# Patient Record
Sex: MALE | Race: WHITE | NOT HISPANIC OR LATINO | Employment: OTHER | URBAN - METROPOLITAN AREA
[De-identification: names, ages, dates, MRNs, and addresses within clinical notes are randomized per-mention and may not be internally consistent; named-entity substitution may affect disease eponyms.]

---

## 2023-08-28 LAB
ALANINE AMINOTRANSFERASE (SGPT) (U/L) IN SER/PLAS: 18 U/L (ref 10–52)
ALBUMIN (G/DL) IN SER/PLAS: 4 G/DL (ref 3.4–5)
ALKALINE PHOSPHATASE (U/L) IN SER/PLAS: 59 U/L (ref 33–136)
ANION GAP IN SER/PLAS: 13 MMOL/L (ref 10–20)
APPEARANCE, URINE: CLEAR
ASPARTATE AMINOTRANSFERASE (SGOT) (U/L) IN SER/PLAS: 17 U/L (ref 9–39)
BILIRUBIN TOTAL (MG/DL) IN SER/PLAS: 0.8 MG/DL (ref 0–1.2)
BILIRUBIN, URINE: NEGATIVE
BLOOD, URINE: NEGATIVE
CALCIDIOL (25 OH VITAMIN D3) (NG/ML) IN SER/PLAS: 34 NG/ML
CALCIUM (MG/DL) IN SER/PLAS: 8.9 MG/DL (ref 8.6–10.3)
CARBON DIOXIDE, TOTAL (MMOL/L) IN SER/PLAS: 24 MMOL/L (ref 21–32)
CHLORIDE (MMOL/L) IN SER/PLAS: 104 MMOL/L (ref 98–107)
CHOLESTEROL (MG/DL) IN SER/PLAS: 117 MG/DL (ref 0–199)
CHOLESTEROL IN HDL (MG/DL) IN SER/PLAS: 39.9 MG/DL
CHOLESTEROL/HDL RATIO: 2.9
COLOR, URINE: YELLOW
CREATININE (MG/DL) IN SER/PLAS: 1.1 MG/DL (ref 0.5–1.3)
ERYTHROCYTE DISTRIBUTION WIDTH (RATIO) BY AUTOMATED COUNT: 14.5 % (ref 11.5–14.5)
ERYTHROCYTE MEAN CORPUSCULAR HEMOGLOBIN CONCENTRATION (G/DL) BY AUTOMATED: 32.8 G/DL (ref 32–36)
ERYTHROCYTE MEAN CORPUSCULAR VOLUME (FL) BY AUTOMATED COUNT: 98 FL (ref 80–100)
ERYTHROCYTES (10*6/UL) IN BLOOD BY AUTOMATED COUNT: 4.24 X10E12/L (ref 4.5–5.9)
GFR MALE: 69 ML/MIN/1.73M2
GLUCOSE (MG/DL) IN SER/PLAS: 91 MG/DL (ref 74–99)
GLUCOSE, URINE: NEGATIVE MG/DL
HEMATOCRIT (%) IN BLOOD BY AUTOMATED COUNT: 41.5 % (ref 41–52)
HEMOGLOBIN (G/DL) IN BLOOD: 13.6 G/DL (ref 13.5–17.5)
KETONES, URINE: NEGATIVE MG/DL
LDL: 31 MG/DL (ref 0–99)
LEUKOCYTE ESTERASE, URINE: NEGATIVE
LEUKOCYTES (10*3/UL) IN BLOOD BY AUTOMATED COUNT: 8.7 X10E9/L (ref 4.4–11.3)
MUCUS, URINE: NORMAL /LPF
NITRITE, URINE: NEGATIVE
NON HDL CHOLESTEROL: 77 MG/DL
PH, URINE: 5 (ref 5–8)
PLATELETS (10*3/UL) IN BLOOD AUTOMATED COUNT: 230 X10E9/L (ref 150–450)
POTASSIUM (MMOL/L) IN SER/PLAS: 4.3 MMOL/L (ref 3.5–5.3)
PROSTATE SPECIFIC AG (NG/ML) IN SER/PLAS: 1.19 NG/ML (ref 0–4)
PROTEIN TOTAL: 7.1 G/DL (ref 6.4–8.2)
PROTEIN, URINE: ABNORMAL MG/DL
RBC, URINE: NORMAL /HPF (ref 0–5)
SODIUM (MMOL/L) IN SER/PLAS: 137 MMOL/L (ref 136–145)
SPECIFIC GRAVITY, URINE: 1.02 (ref 1–1.03)
THYROTROPIN (MIU/L) IN SER/PLAS BY DETECTION LIMIT <= 0.05 MIU/L: 1.97 MIU/L (ref 0.44–3.98)
TRIGLYCERIDE (MG/DL) IN SER/PLAS: 231 MG/DL (ref 0–149)
UREA NITROGEN (MG/DL) IN SER/PLAS: 17 MG/DL (ref 6–23)
UROBILINOGEN, URINE: <2 MG/DL (ref 0–1.9)
VLDL: 46 MG/DL (ref 0–40)
WBC, URINE: 1 /HPF (ref 0–5)

## 2023-12-12 PROBLEM — E78.2 MIXED HYPERLIPIDEMIA: Status: ACTIVE | Noted: 2023-12-12

## 2023-12-12 PROBLEM — G47.30 SLEEP APNEA: Status: ACTIVE | Noted: 2023-12-12

## 2023-12-12 PROBLEM — I25.10 ARTERIOSCLEROTIC CARDIOVASCULAR DISEASE: Status: ACTIVE | Noted: 2023-12-12

## 2023-12-12 PROBLEM — I71.21 ANEURYSM OF ASCENDING AORTA WITHOUT RUPTURE (CMS-HCC): Status: ACTIVE | Noted: 2023-12-12

## 2023-12-12 PROBLEM — J44.1 COPD EXACERBATION (MULTI): Status: ACTIVE | Noted: 2023-12-12

## 2023-12-12 PROBLEM — H61.22 IMPACTED CERUMEN OF LEFT EAR: Status: ACTIVE | Noted: 2023-12-12

## 2023-12-12 PROBLEM — J44.9 COPD (CHRONIC OBSTRUCTIVE PULMONARY DISEASE) (MULTI): Status: ACTIVE | Noted: 2023-12-12

## 2023-12-12 PROBLEM — J40 BRONCHITIS: Status: ACTIVE | Noted: 2023-12-12

## 2023-12-12 PROBLEM — I25.708 ATHEROSCLEROSIS OF CABG W OTH ANGINA PECTORIS (CMS-HCC): Status: ACTIVE | Noted: 2023-12-12

## 2023-12-12 PROBLEM — I50.9 CHF (CONGESTIVE HEART FAILURE) (MULTI): Status: ACTIVE | Noted: 2023-12-12

## 2023-12-12 PROBLEM — D22.9 ATYPICAL MOLE: Status: ACTIVE | Noted: 2023-12-12

## 2023-12-12 PROBLEM — I10 ESSENTIAL HYPERTENSION: Status: ACTIVE | Noted: 2023-12-12

## 2023-12-12 PROBLEM — E66.9 CLASS 1 OBESITY WITH BODY MASS INDEX (BMI) OF 32.0 TO 32.9 IN ADULT: Status: ACTIVE | Noted: 2023-12-12

## 2023-12-12 PROBLEM — H81.03 MENIERE'S DISEASE OF BOTH EARS: Status: ACTIVE | Noted: 2023-12-12

## 2023-12-12 PROBLEM — Z98.62 STATUS POST ANGIOPLASTY: Status: ACTIVE | Noted: 2023-12-12

## 2023-12-12 PROBLEM — E66.811 CLASS 1 OBESITY WITH BODY MASS INDEX (BMI) OF 32.0 TO 32.9 IN ADULT: Status: ACTIVE | Noted: 2023-12-12

## 2023-12-12 RX ORDER — TAMSULOSIN HYDROCHLORIDE 0.4 MG/1
0.4 CAPSULE ORAL DAILY
COMMUNITY

## 2023-12-12 RX ORDER — TRIAMCINOLONE ACETONIDE 1 MG/G
1 CREAM TOPICAL 3 TIMES DAILY
COMMUNITY
Start: 2023-01-13

## 2023-12-12 RX ORDER — AMLODIPINE BESYLATE 5 MG/1
1 TABLET ORAL DAILY
COMMUNITY
Start: 2021-11-01 | End: 2024-01-03 | Stop reason: SDUPTHER

## 2023-12-12 RX ORDER — EZETIMIBE 10 MG/1
1 TABLET ORAL DAILY
COMMUNITY
Start: 2021-11-01 | End: 2024-01-03 | Stop reason: SDUPTHER

## 2023-12-12 RX ORDER — VALSARTAN 80 MG/1
1 TABLET ORAL DAILY
COMMUNITY
Start: 2021-11-01 | End: 2024-01-03 | Stop reason: SDUPTHER

## 2023-12-12 RX ORDER — NITROGLYCERIN 0.4 MG/1
0.4 TABLET SUBLINGUAL EVERY 5 MIN PRN
COMMUNITY
Start: 2021-11-01

## 2023-12-12 RX ORDER — IPRATROPIUM BROMIDE AND ALBUTEROL SULFATE 2.5; .5 MG/3ML; MG/3ML
3 SOLUTION RESPIRATORY (INHALATION) EVERY 4 HOURS PRN
COMMUNITY
End: 2024-02-08 | Stop reason: SDUPTHER

## 2023-12-12 RX ORDER — ROSUVASTATIN CALCIUM 40 MG/1
1 TABLET, COATED ORAL DAILY
COMMUNITY
Start: 2021-11-01 | End: 2024-01-03 | Stop reason: SDUPTHER

## 2023-12-12 RX ORDER — METOPROLOL SUCCINATE 25 MG/1
1 TABLET, EXTENDED RELEASE ORAL DAILY
COMMUNITY
Start: 2021-11-01 | End: 2024-01-03 | Stop reason: SDUPTHER

## 2023-12-12 RX ORDER — FUROSEMIDE 40 MG/1
40 TABLET ORAL
COMMUNITY
Start: 2021-11-01 | End: 2024-01-03 | Stop reason: SDUPTHER

## 2023-12-12 RX ORDER — CLOPIDOGREL BISULFATE 75 MG/1
1 TABLET ORAL DAILY
COMMUNITY
Start: 2019-08-31 | End: 2024-01-03 | Stop reason: SDUPTHER

## 2024-01-03 ENCOUNTER — OFFICE VISIT (OUTPATIENT)
Dept: PRIMARY CARE | Facility: CLINIC | Age: 79
End: 2024-01-03
Payer: COMMERCIAL

## 2024-01-03 VITALS
HEART RATE: 60 BPM | TEMPERATURE: 97.8 F | BODY MASS INDEX: 34.54 KG/M2 | SYSTOLIC BLOOD PRESSURE: 120 MMHG | DIASTOLIC BLOOD PRESSURE: 78 MMHG | HEIGHT: 72 IN | WEIGHT: 255 LBS

## 2024-01-03 DIAGNOSIS — J40 BRONCHITIS: Primary | ICD-10-CM

## 2024-01-03 DIAGNOSIS — I50.32 CHRONIC DIASTOLIC CONGESTIVE HEART FAILURE (MULTI): ICD-10-CM

## 2024-01-03 DIAGNOSIS — I10 ESSENTIAL HYPERTENSION: ICD-10-CM

## 2024-01-03 DIAGNOSIS — E78.2 MIXED HYPERLIPIDEMIA: ICD-10-CM

## 2024-01-03 PROCEDURE — 99214 OFFICE O/P EST MOD 30 MIN: CPT | Performed by: INTERNAL MEDICINE

## 2024-01-03 PROCEDURE — 96372 THER/PROPH/DIAG INJ SC/IM: CPT | Performed by: INTERNAL MEDICINE

## 2024-01-03 PROCEDURE — 1036F TOBACCO NON-USER: CPT | Performed by: INTERNAL MEDICINE

## 2024-01-03 PROCEDURE — 3078F DIAST BP <80 MM HG: CPT | Performed by: INTERNAL MEDICINE

## 2024-01-03 PROCEDURE — 1159F MED LIST DOCD IN RCRD: CPT | Performed by: INTERNAL MEDICINE

## 2024-01-03 PROCEDURE — 3074F SYST BP LT 130 MM HG: CPT | Performed by: INTERNAL MEDICINE

## 2024-01-03 RX ORDER — VALSARTAN 80 MG/1
80 TABLET ORAL DAILY
Qty: 90 TABLET | Refills: 1 | Status: SHIPPED | OUTPATIENT
Start: 2024-01-03

## 2024-01-03 RX ORDER — ASPIRIN 81 MG/1
81 TABLET ORAL DAILY
COMMUNITY
End: 2024-03-26 | Stop reason: ALTCHOICE

## 2024-01-03 RX ORDER — AMLODIPINE BESYLATE 5 MG/1
5 TABLET ORAL DAILY
Qty: 90 TABLET | Refills: 1 | Status: SHIPPED | OUTPATIENT
Start: 2024-01-03

## 2024-01-03 RX ORDER — EZETIMIBE 10 MG/1
10 TABLET ORAL DAILY
Qty: 90 TABLET | Refills: 1 | Status: SHIPPED | OUTPATIENT
Start: 2024-01-03

## 2024-01-03 RX ORDER — METHYLPREDNISOLONE ACETATE 80 MG/ML
80 INJECTION, SUSPENSION INTRA-ARTICULAR; INTRALESIONAL; INTRAMUSCULAR; SOFT TISSUE ONCE
Status: COMPLETED | OUTPATIENT
Start: 2024-01-03 | End: 2024-01-03

## 2024-01-03 RX ORDER — FUROSEMIDE 40 MG/1
40 TABLET ORAL 3 TIMES WEEKLY
Qty: 36 TABLET | Refills: 1 | Status: SHIPPED | OUTPATIENT
Start: 2024-01-03

## 2024-01-03 RX ORDER — AZITHROMYCIN 500 MG/1
500 TABLET, FILM COATED ORAL DAILY
Qty: 5 TABLET | Refills: 0 | Status: SHIPPED | OUTPATIENT
Start: 2024-01-03 | End: 2024-01-08

## 2024-01-03 RX ORDER — ROSUVASTATIN CALCIUM 40 MG/1
40 TABLET, COATED ORAL DAILY
Qty: 90 TABLET | Refills: 1 | Status: SHIPPED | OUTPATIENT
Start: 2024-01-03

## 2024-01-03 RX ORDER — METOPROLOL SUCCINATE 25 MG/1
25 TABLET, EXTENDED RELEASE ORAL DAILY
Qty: 90 TABLET | Refills: 1 | Status: SHIPPED | OUTPATIENT
Start: 2024-01-03

## 2024-01-03 RX ORDER — PREDNISONE 20 MG/1
20 TABLET ORAL 2 TIMES DAILY
Qty: 10 TABLET | Refills: 0 | Status: SHIPPED | OUTPATIENT
Start: 2024-01-03 | End: 2024-01-08

## 2024-01-03 RX ORDER — CLOPIDOGREL BISULFATE 75 MG/1
75 TABLET ORAL DAILY
Qty: 90 TABLET | Refills: 1 | Status: SHIPPED | OUTPATIENT
Start: 2024-01-03

## 2024-01-03 RX ADMIN — METHYLPREDNISOLONE ACETATE 80 MG: 80 INJECTION, SUSPENSION INTRA-ARTICULAR; INTRALESIONAL; INTRAMUSCULAR; SOFT TISSUE at 10:12

## 2024-01-03 ASSESSMENT — ENCOUNTER SYMPTOMS
DEPRESSION: 0
COUGH: 1
SHORTNESS OF BREATH: 1
SORE THROAT: 0
DYSURIA: 0
LIGHT-HEADEDNESS: 0
ABDOMINAL PAIN: 0
MUSCULOSKELETAL PAIN: 1
MYALGIAS: 0
ACTIVITY CHANGE: 0

## 2024-01-03 ASSESSMENT — PATIENT HEALTH QUESTIONNAIRE - PHQ9
2. FEELING DOWN, DEPRESSED OR HOPELESS: NOT AT ALL
1. LITTLE INTEREST OR PLEASURE IN DOING THINGS: NOT AT ALL
SUM OF ALL RESPONSES TO PHQ9 QUESTIONS 1 AND 2: 0

## 2024-01-03 NOTE — PROGRESS NOTES
CHIEF COMPLAINT:    Lui Yu is a 78 y.o. male who presents for Follow-up (6 months), Med Refill, Sinusitis (FOR THE PAST WEEK ), Shortness of Breath, Cough, and Muscle Pain.    HISTORY OF PRESENT ILLNESS:  Lui Yu  is a pleasant 78-year-old gentleman comes here for medications refill.  Otherwise he also has cough, congestion thick nasal discharge.  He has been using facial tissue a lot.  He did not sleep well last night.  He denies any fever or chills.  In fact he did not even mention me about this respiratory issues.  He thought he would be talking about.  However he sounded very nasally.  He has nasal discharge and also occasional cough and wheezing.  Patient got this from the family and friends probably will back together during the Terri time.    Med Refill  Associated symptoms include coughing. Pertinent negatives include no abdominal pain, chest pain, congestion, myalgias, rash or sore throat.   Sinusitis  Associated symptoms include coughing and shortness of breath. Pertinent negatives include no congestion or sore throat.   Shortness of Breath  Pertinent negatives include no abdominal pain, chest pain, rash or sore throat.   Cough  Associated symptoms include shortness of breath. Pertinent negatives include no chest pain, myalgias, rash or sore throat.   Muscle Pain  Associated symptoms include shortness of breath. Pertinent negatives include no abdominal pain, chest pain, dysuria or rash.       Review of Systems   Constitutional:  Negative for activity change.   HENT:  Negative for congestion and sore throat.    Respiratory:  Positive for cough and shortness of breath.    Cardiovascular:  Negative for chest pain.   Gastrointestinal:  Negative for abdominal pain.   Endocrine: Negative for polyuria.   Genitourinary:  Negative for dysuria.   Musculoskeletal:  Negative for myalgias.   Skin:  Negative for rash.   Neurological:  Negative for light-headedness.   Psychiatric/Behavioral:  Negative  for behavioral problems.      Visit Vitals  /78 (BP Location: Right arm, Patient Position: Sitting, BP Cuff Size: Large adult)   Pulse 60   Temp 36.6 °C (97.8 °F) (Temporal)   Ht 1.829 m (6')   Wt 116 kg (255 lb)   BMI 34.58 kg/m²   Smoking Status Former   BSA 2.43 m²         Wt Readings from Last 10 Encounters:   01/03/24 116 kg (255 lb)   07/28/23 113 kg (249 lb)   06/23/23 114 kg (251 lb 4 oz)   06/01/23 113 kg (248 lb 12.8 oz)   02/15/23 114 kg (252 lb)   01/13/23 116 kg (255 lb)   08/16/22 114 kg (252 lb)   07/18/22 112 kg (247 lb 4 oz)   04/18/22 113 kg (250 lb)   02/02/22 112 kg (248 lb)       Physical Exam  Vitals and nursing note reviewed.   Constitutional:       Appearance: Normal appearance.   HENT:      Head: Normocephalic.      Right Ear: Tympanic membrane normal.      Left Ear: Tympanic membrane normal.      Nose: Nose normal.      Mouth/Throat:      Mouth: Mucous membranes are moist.   Cardiovascular:      Rate and Rhythm: Normal rate and regular rhythm.      Pulses: Normal pulses.      Heart sounds: No murmur heard.  Pulmonary:      Effort: No respiratory distress.      Breath sounds: Normal breath sounds.   Abdominal:      Palpations: Abdomen is soft.   Musculoskeletal:      Cervical back: Neck supple.      Right lower leg: No edema.      Left lower leg: No edema.   Skin:     General: Skin is warm.      Findings: No rash.   Neurological:      General: No focal deficit present.      Mental Status: He is alert and oriented to person, place, and time.   Psychiatric:         Mood and Affect: Mood normal.        RECENT LABS:  Lab Results   Component Value Date    WBC 8.7 08/28/2023    HGB 13.6 08/28/2023    HCT 41.5 08/28/2023     08/28/2023    CHOL 117 08/28/2023    TRIG 231 (H) 08/28/2023    HDL 39.9 (A) 08/28/2023    ALT 18 08/28/2023    AST 17 08/28/2023     08/28/2023    K 4.3 08/28/2023     08/28/2023    CREATININE 1.10 08/28/2023    BUN 17 08/28/2023    CO2 24 08/28/2023     TSH 1.97 08/28/2023    PSA 1.19 08/28/2023    INR 1.0 08/29/2019     IMAGING:  Reviewed:   MEDICATIONS:   Current Outpatient Medications   Medication Instructions    amLODIPine (NORVASC) 5 mg, oral, Daily    aspirin 81 mg, oral, Daily, as directed    azithromycin (ZITHROMAX) 500 mg, oral, Daily    clopidogrel (PLAVIX) 75 mg, oral, Daily    ezetimibe (ZETIA) 10 mg, oral, Daily    furosemide (LASIX) 40 mg, oral, 3 times weekly, TAKE 1 TABLET BY MOUTH ON MONDAY, WEDNESDAY, AND FRIDAY    ipratropium-albuteroL (Duo-Neb) 0.5-2.5 mg/3 mL nebulizer solution 3 mL, nebulization, Every 4 hours PRN    metoprolol succinate XL (TOPROL-XL) 25 mg, oral, Daily    nitroglycerin (NITROSTAT) 0.4 mg, sublingual, Every 5 min PRN, PLACE 1 TABLET UNDER THE TONGUE EVERY 5 MINUTES FOR UP TO 3 DOSES AS NEEDED FOR CHEST PAIN.CALL 911 IF PAIN PERSISTS.<BR>    predniSONE (DELTASONE) 20 mg, oral, 2 times daily    rosuvastatin (CRESTOR) 40 mg, oral, Daily    tamsulosin (FLOMAX) 0.4 mg, oral, Daily    triamcinolone (Kenalog) 0.1 % cream 1 Application, Topical, 3 times daily    valsartan (DIOVAN) 80 mg, oral, Daily      TODAY'S VISIT  DX:   1. Bronchitis  methylPREDNISolone acetate (DEPO-Medrol) injection 80 mg    azithromycin (Zithromax) 500 mg tablet    predniSONE (Deltasone) 20 mg tablet      2. Essential hypertension  amLODIPine (Norvasc) 5 mg tablet    clopidogrel (Plavix) 75 mg tablet    metoprolol succinate XL (Toprol-XL) 25 mg 24 hr tablet    furosemide (Lasix) 40 mg tablet    valsartan (Diovan) 80 mg tablet      3. Mixed hyperlipidemia  ezetimibe (Zetia) 10 mg tablet    rosuvastatin (Crestor) 40 mg tablet      4. Chronic diastolic congestive heart failure (CMS/HCC)           MEDICAL DECISION MAKING:  - Recent lab work and relevant imaging studies have been reviewed.    - The current active medical co morbidities have been considered.   - Relevant correspondence/notes from specialty consultants were reviewed and discussed with patient.     - Patient was given treatment as per above plan.   - Patient will continue with current medical therapy and plan.   - Medication have been sent for refill.    - Next Follow up in 6 months..

## 2024-02-08 ENCOUNTER — OFFICE VISIT (OUTPATIENT)
Dept: PRIMARY CARE | Facility: CLINIC | Age: 79
End: 2024-02-08
Payer: COMMERCIAL

## 2024-02-08 VITALS
WEIGHT: 255 LBS | TEMPERATURE: 98.4 F | SYSTOLIC BLOOD PRESSURE: 118 MMHG | HEIGHT: 70 IN | BODY MASS INDEX: 36.51 KG/M2 | HEART RATE: 60 BPM | DIASTOLIC BLOOD PRESSURE: 80 MMHG

## 2024-02-08 DIAGNOSIS — I10 ESSENTIAL HYPERTENSION: ICD-10-CM

## 2024-02-08 DIAGNOSIS — J44.1 COPD EXACERBATION (MULTI): ICD-10-CM

## 2024-02-08 DIAGNOSIS — J44.0 CHRONIC OBSTRUCTIVE PULMONARY DISEASE WITH ACUTE LOWER RESPIRATORY INFECTION (MULTI): ICD-10-CM

## 2024-02-08 DIAGNOSIS — J20.9 ACUTE BRONCHITIS, UNSPECIFIED ORGANISM: ICD-10-CM

## 2024-02-08 DIAGNOSIS — J40 BRONCHITIS: Primary | ICD-10-CM

## 2024-02-08 DIAGNOSIS — I50.32 CHRONIC DIASTOLIC CONGESTIVE HEART FAILURE (MULTI): ICD-10-CM

## 2024-02-08 DIAGNOSIS — H10.33 ACUTE CONJUNCTIVITIS OF BOTH EYES, UNSPECIFIED ACUTE CONJUNCTIVITIS TYPE: ICD-10-CM

## 2024-02-08 PROCEDURE — 1036F TOBACCO NON-USER: CPT | Performed by: FAMILY MEDICINE

## 2024-02-08 PROCEDURE — 99214 OFFICE O/P EST MOD 30 MIN: CPT | Performed by: FAMILY MEDICINE

## 2024-02-08 PROCEDURE — 1159F MED LIST DOCD IN RCRD: CPT | Performed by: FAMILY MEDICINE

## 2024-02-08 PROCEDURE — 3074F SYST BP LT 130 MM HG: CPT | Performed by: FAMILY MEDICINE

## 2024-02-08 PROCEDURE — 3079F DIAST BP 80-89 MM HG: CPT | Performed by: FAMILY MEDICINE

## 2024-02-08 RX ORDER — GUAIFENESIN 600 MG/1
1200 TABLET, EXTENDED RELEASE ORAL 2 TIMES DAILY
Qty: 120 TABLET | Refills: 11 | Status: SHIPPED | OUTPATIENT
Start: 2024-02-08 | End: 2025-02-07

## 2024-02-08 RX ORDER — PREDNISONE 20 MG/1
40 TABLET ORAL DAILY
Qty: 10 TABLET | Refills: 0 | Status: SHIPPED | OUTPATIENT
Start: 2024-02-08 | End: 2024-02-13

## 2024-02-08 RX ORDER — IPRATROPIUM BROMIDE AND ALBUTEROL SULFATE 2.5; .5 MG/3ML; MG/3ML
3 SOLUTION RESPIRATORY (INHALATION) EVERY 4 HOURS PRN
Qty: 180 ML | Refills: 1 | Status: SHIPPED | OUTPATIENT
Start: 2024-02-08

## 2024-02-08 RX ORDER — DOXYCYCLINE 100 MG/1
100 CAPSULE ORAL 2 TIMES DAILY
Qty: 20 CAPSULE | Refills: 0 | Status: SHIPPED | OUTPATIENT
Start: 2024-02-08 | End: 2024-02-18

## 2024-02-08 RX ORDER — TOBRAMYCIN 3 MG/ML
2 SOLUTION/ DROPS OPHTHALMIC EVERY 4 HOURS
Qty: 25 ML | Refills: 0 | Status: SHIPPED | OUTPATIENT
Start: 2024-02-08 | End: 2024-02-19 | Stop reason: SDUPTHER

## 2024-02-08 ASSESSMENT — ENCOUNTER SYMPTOMS: DEPRESSION: 0

## 2024-02-08 ASSESSMENT — PATIENT HEALTH QUESTIONNAIRE - PHQ9
SUM OF ALL RESPONSES TO PHQ9 QUESTIONS 1 AND 2: 0
1. LITTLE INTEREST OR PLEASURE IN DOING THINGS: NOT AT ALL
2. FEELING DOWN, DEPRESSED OR HOPELESS: NOT AT ALL

## 2024-02-08 NOTE — PROGRESS NOTES
"Subjective   Chief complaint: Lui Yu is a 78 y.o. male who presents for Follow-up (2 week follow up.), Cough (Little yellow mucous is coming up.), Fatigue, Shortness of Breath, and Muscle Pain.    HPI:  HPI  Cough  Sputum  Sob  Flynn  Wheeze  2 weeks  Worse  No fever  Chest tingle occas  No gi  Slight ha and sore throat    Objective   /80 (BP Location: Right arm, Patient Position: Sitting, BP Cuff Size: Large adult)   Pulse 60   Temp 36.9 °C (98.4 °F) (Temporal)   Ht 1.765 m (5' 9.5\")   Wt 116 kg (255 lb)   BMI 37.12 kg/m²   Physical Exam  Ronchi  Prolong exp phas  Exp wheexe  Ill non toxic    Review of Systems   I have reviewed and reconciled the medication list with the patient today.   Current Outpatient Medications:     amLODIPine (Norvasc) 5 mg tablet, Take 1 tablet (5 mg) by mouth once daily., Disp: 90 tablet, Rfl: 1    aspirin 81 mg EC tablet, Take 1 tablet (81 mg) by mouth once daily. as directed, Disp: , Rfl:     clopidogrel (Plavix) 75 mg tablet, Take 1 tablet (75 mg) by mouth once daily., Disp: 90 tablet, Rfl: 1    ezetimibe (Zetia) 10 mg tablet, Take 1 tablet (10 mg) by mouth once daily., Disp: 90 tablet, Rfl: 1    furosemide (Lasix) 40 mg tablet, Take 1 tablet (40 mg) by mouth 3 (three) times a week. TAKE 1 TABLET BY MOUTH ON MONDAY, WEDNESDAY, AND FRIDAY, Disp: 36 tablet, Rfl: 1    metoprolol succinate XL (Toprol-XL) 25 mg 24 hr tablet, Take 1 tablet (25 mg) by mouth once daily., Disp: 90 tablet, Rfl: 1    nitroglycerin (Nitrostat) 0.4 mg SL tablet, Place 1 tablet (0.4 mg) under the tongue every 5 minutes if needed for chest pain. PLACE 1 TABLET UNDER THE TONGUE EVERY 5 MINUTES FOR UP TO 3 DOSES AS NEEDED FOR CHEST PAIN.CALL 911 IF PAIN PERSISTS., Disp: , Rfl:     rosuvastatin (Crestor) 40 mg tablet, Take 1 tablet (40 mg) by mouth once daily., Disp: 90 tablet, Rfl: 1    tamsulosin (Flomax) 0.4 mg 24 hr capsule, Take 1 capsule (0.4 mg) by mouth once daily., Disp: , Rfl:     triamcinolone " (Kenalog) 0.1 % cream, Apply 1 Application topically 3 times a day., Disp: , Rfl:     valsartan (Diovan) 80 mg tablet, Take 1 tablet (80 mg) by mouth once daily., Disp: 90 tablet, Rfl: 1    doxycycline (Vibramycin) 100 mg capsule, Take 1 capsule (100 mg) by mouth 2 times a day for 10 days. Take with at least 8 ounces (large glass) of water, do not lie down for 30 minutes after, Disp: 20 capsule, Rfl: 0    guaiFENesin (Mucinex) 600 mg 12 hr tablet, Take 2 tablets (1,200 mg) by mouth 2 times a day. Do not crush, chew, or split., Disp: 120 tablet, Rfl: 11    ipratropium-albuteroL (Duo-Neb) 0.5-2.5 mg/3 mL nebulizer solution, Take 3 mL by nebulization every 4 hours if needed for wheezing., Disp: 180 mL, Rfl: 1    predniSONE (Deltasone) 20 mg tablet, Take 2 tablets (40 mg) by mouth once daily for 5 days., Disp: 10 tablet, Rfl: 0    tobramycin (Tobrex) 0.3 % ophthalmic solution, Administer 2 drops into both eyes every 4 hours for 7 days., Disp: 25 mL, Rfl: 0     Imaging:  No results found.     Labs reviewed:    Lab Results   Component Value Date    WBC 8.7 08/28/2023    HGB 13.6 08/28/2023    HCT 41.5 08/28/2023     08/28/2023    CHOL 117 08/28/2023    TRIG 231 (H) 08/28/2023    HDL 39.9 (A) 08/28/2023    ALT 18 08/28/2023    AST 17 08/28/2023     08/28/2023    K 4.3 08/28/2023     08/28/2023    CREATININE 1.10 08/28/2023    BUN 17 08/28/2023    CO2 24 08/28/2023    TSH 1.97 08/28/2023    PSA 1.19 08/28/2023    INR 1.0 08/29/2019       Assessment/Plan   Problem List Items Addressed This Visit             ICD-10-CM    COPD (chronic obstructive pulmonary disease) (CMS/[facility]) J44.9    Essential hypertension I10    Bronchitis - Primary J40    Relevant Medications    ipratropium-albuteroL (Duo-Neb) 0.5-2.5 mg/3 mL nebulizer solution    CHF (congestive heart failure) (CMS/[facility]) I50.9    COPD exacerbation (CMS/[facility]) J44.1    Relevant Medications    doxycycline (Vibramycin) 100 mg capsule    predniSONE (Deltasone)  20 mg tablet    guaiFENesin (Mucinex) 600 mg 12 hr tablet     Other Visit Diagnoses         Codes    Acute bronchitis, unspecified organism     J20.9    Relevant Medications    doxycycline (Vibramycin) 100 mg capsule    predniSONE (Deltasone) 20 mg tablet    guaiFENesin (Mucinex) 600 mg 12 hr tablet    Acute conjunctivitis of both eyes, unspecified acute conjunctivitis type     H10.33    Relevant Medications    tobramycin (Tobrex) 0.3 % ophthalmic solution            Reinforced lifestyle modifications  Continue current medications as listed  Physical activity as tolerated and healthy diet encouraged  Maintain a healthy weight  Follow up in        Adequate

## 2024-02-14 ENCOUNTER — TELEPHONE (OUTPATIENT)
Dept: PRIMARY CARE | Facility: CLINIC | Age: 79
End: 2024-02-14
Payer: COMMERCIAL

## 2024-02-14 DIAGNOSIS — G47.33 OBSTRUCTIVE SLEEP APNEA SYNDROME: Primary | ICD-10-CM

## 2024-02-14 NOTE — TELEPHONE ENCOUNTER
Patient's wife Pablo calling to request order for new CPAP machine to be faxed to [facility]  Stated that current machine is not working well, and noted on machine that is is wore out.   Order in chart    LOV 2/8/24    Pending 7/2/24

## 2024-02-19 DIAGNOSIS — H10.33 ACUTE CONJUNCTIVITIS OF BOTH EYES, UNSPECIFIED ACUTE CONJUNCTIVITIS TYPE: ICD-10-CM

## 2024-02-19 RX ORDER — TOBRAMYCIN 3 MG/ML
2 SOLUTION/ DROPS OPHTHALMIC EVERY 4 HOURS
Qty: 25 ML | Refills: 0 | Status: SHIPPED | OUTPATIENT
Start: 2024-02-19 | End: 2024-02-26

## 2024-02-19 NOTE — TELEPHONE ENCOUNTER
Pt's wife called office to say that pt never received pended med. Could you please resend it? Thank you :)

## 2024-03-04 LAB
NON-UH HIE ANION GAP: 10.9 (ref 6–15)
NON-UH HIE BLOOD UREA NITROGEN: 16 MG/DL (ref 7–25)
NON-UH HIE CALCIUM: 8.9 MG/DL (ref 8.6–10.3)
NON-UH HIE CARBON DIOXIDE: 23.3 MMOL/L (ref 21–31)
NON-UH HIE CHLORIDE: 107 MMOL/L (ref 98–107)
NON-UH HIE CREATININE: 1.16 MG/DL (ref 0.7–1.3)
NON-UH HIE ESTIMATED GFR: > 60
NON-UH HIE GLUCOSE: 93 MG/DL (ref 70–100)
NON-UH HIE POTASSIUM: 4.2 MMOL/L (ref 3.5–5.1)
NON-UH HIE SODIUM: 137 MMOL/L (ref 136–145)

## 2024-03-26 ENCOUNTER — OFFICE VISIT (OUTPATIENT)
Dept: CARDIOLOGY | Facility: CLINIC | Age: 79
End: 2024-03-26
Payer: COMMERCIAL

## 2024-03-26 VITALS
WEIGHT: 257 LBS | HEIGHT: 72 IN | BODY MASS INDEX: 34.81 KG/M2 | SYSTOLIC BLOOD PRESSURE: 120 MMHG | HEART RATE: 60 BPM | DIASTOLIC BLOOD PRESSURE: 70 MMHG

## 2024-03-26 DIAGNOSIS — I71.21 ANEURYSM OF ASCENDING AORTA WITHOUT RUPTURE (CMS-HCC): ICD-10-CM

## 2024-03-26 DIAGNOSIS — J42 CHRONIC BRONCHITIS, UNSPECIFIED CHRONIC BRONCHITIS TYPE (MULTI): ICD-10-CM

## 2024-03-26 DIAGNOSIS — Z87.891 FORMER SMOKER: ICD-10-CM

## 2024-03-26 DIAGNOSIS — G47.30 SLEEP APNEA, UNSPECIFIED TYPE: ICD-10-CM

## 2024-03-26 DIAGNOSIS — I25.10 ARTERIOSCLEROTIC CARDIOVASCULAR DISEASE: Primary | ICD-10-CM

## 2024-03-26 DIAGNOSIS — E66.9 OBESITY (BMI 30.0-34.9): ICD-10-CM

## 2024-03-26 DIAGNOSIS — I10 ESSENTIAL HYPERTENSION: ICD-10-CM

## 2024-03-26 DIAGNOSIS — I50.9 CHRONIC CONGESTIVE HEART FAILURE, UNSPECIFIED HEART FAILURE TYPE (MULTI): ICD-10-CM

## 2024-03-26 DIAGNOSIS — E78.2 MIXED HYPERLIPIDEMIA: ICD-10-CM

## 2024-03-26 DIAGNOSIS — Z98.62 STATUS POST ANGIOPLASTY: ICD-10-CM

## 2024-03-26 PROBLEM — I25.708 ATHEROSCLEROSIS OF CABG W OTH ANGINA PECTORIS (CMS-HCC): Status: RESOLVED | Noted: 2023-12-12 | Resolved: 2024-03-26

## 2024-03-26 PROCEDURE — 3078F DIAST BP <80 MM HG: CPT | Performed by: INTERNAL MEDICINE

## 2024-03-26 PROCEDURE — 99214 OFFICE O/P EST MOD 30 MIN: CPT | Performed by: INTERNAL MEDICINE

## 2024-03-26 PROCEDURE — 1159F MED LIST DOCD IN RCRD: CPT | Performed by: INTERNAL MEDICINE

## 2024-03-26 PROCEDURE — 1036F TOBACCO NON-USER: CPT | Performed by: INTERNAL MEDICINE

## 2024-03-26 PROCEDURE — 3074F SYST BP LT 130 MM HG: CPT | Performed by: INTERNAL MEDICINE

## 2024-03-26 ASSESSMENT — ENCOUNTER SYMPTOMS: SHORTNESS OF BREATH: 1

## 2024-03-26 NOTE — PROGRESS NOTES
Subjective   Lui Yu is a 78 y.o. male       Chief Complaint    Follow-up          HPI   Patient is in the office for follow-up for the problems noted below with no events noted since he was last seen.  He has chronic dyspnea which is caused by his pulmonary disease.  He has gained 10 pounds from last visit due to his dietary indiscretion.  He denies any angina palpitations syncope or near syncope and no evidence of volume overload.  His lab data from several months ago were reviewed by myself and shared with the patient and there was no areas of concern noted.  Lifestyle changes to reduce his weight and stay physically active were emphasized again today.  Did not need to make any changes to his medications.    Assessment/recommendations:     1-chronic mild dyspnea which is multifactorial but currently not concerning. He does not utilize nitroglycerin  2-hyperlipidemia, on statin therapy, historically has been controlled. LDL cholesterol below 60 mg/dL  3-hypertension presently under control medical therapy with no side effects  4-COPD, this is a contributing factor the patient's symptoms of dyspnea.  5-class I obesity, recommended restrictions of calorie intake and hopefully more activities   6-severe coronary artery disease involving all 3 vessels with previous bypass surgery and only LIMA graft to LAD is open based on cardiac catheterization June 2020, will continue present medical therapy including ranolazine   7-status post PCI of saphenous vein graft to ramus intermedius August 2019, that bypasses closed based on the last cardiac catheterization  8-ascending aortic aneurysm that is diameter being followed noninvasively, last CT angiography in 2023 measured 4.5 cm diameter, will order a CT scan of the chest for follow-up.  9-sleep apnea on CPAP machine  Review of Systems   Respiratory:  Positive for shortness of breath.    All other systems reviewed and are negative.           Vitals:    03/26/24 0902    BP: 120/70   BP Location: Right arm   Patient Position: Sitting   Pulse: 60   Weight: 117 kg (257 lb)   Height: 1.829 m (6')        Objective   Physical Exam  Constitutional:       Appearance: Normal appearance.   HENT:      Nose: Nose normal.   Neck:      Vascular: No carotid bruit.   Cardiovascular:      Rate and Rhythm: Normal rate.      Pulses: Normal pulses.      Heart sounds: Normal heart sounds.   Pulmonary:      Effort: Pulmonary effort is normal.      Comments: Moi crackles  Abdominal:      General: Bowel sounds are normal.      Palpations: Abdomen is soft.   Musculoskeletal:         General: Normal range of motion.      Cervical back: Normal range of motion.      Right lower leg: No edema.      Left lower leg: No edema.   Skin:     General: Skin is warm and dry.   Neurological:      General: No focal deficit present.      Mental Status: He is alert.   Psychiatric:         Mood and Affect: Mood normal.         Behavior: Behavior normal.         Thought Content: Thought content normal.         Judgment: Judgment normal.         Allergies  Patient has no known allergies.     Current Medications    Current Outpatient Medications:     amLODIPine (Norvasc) 5 mg tablet, Take 1 tablet (5 mg) by mouth once daily., Disp: 90 tablet, Rfl: 1    clopidogrel (Plavix) 75 mg tablet, Take 1 tablet (75 mg) by mouth once daily., Disp: 90 tablet, Rfl: 1    ezetimibe (Zetia) 10 mg tablet, Take 1 tablet (10 mg) by mouth once daily., Disp: 90 tablet, Rfl: 1    furosemide (Lasix) 40 mg tablet, Take 1 tablet (40 mg) by mouth 3 (three) times a week. TAKE 1 TABLET BY MOUTH ON MONDAY, WEDNESDAY, AND FRIDAY, Disp: 36 tablet, Rfl: 1    guaiFENesin (Mucinex) 600 mg 12 hr tablet, Take 2 tablets (1,200 mg) by mouth 2 times a day. Do not crush, chew, or split., Disp: 120 tablet, Rfl: 11    ipratropium-albuteroL (Duo-Neb) 0.5-2.5 mg/3 mL nebulizer solution, Take 3 mL by nebulization every 4 hours if needed for wheezing., Disp: 180 mL, Rfl:  1    metoprolol succinate XL (Toprol-XL) 25 mg 24 hr tablet, Take 1 tablet (25 mg) by mouth once daily., Disp: 90 tablet, Rfl: 1    nitroglycerin (Nitrostat) 0.4 mg SL tablet, Place 1 tablet (0.4 mg) under the tongue every 5 minutes if needed for chest pain. PLACE 1 TABLET UNDER THE TONGUE EVERY 5 MINUTES FOR UP TO 3 DOSES AS NEEDED FOR CHEST PAIN.CALL 911 IF PAIN PERSISTS., Disp: , Rfl:     rosuvastatin (Crestor) 40 mg tablet, Take 1 tablet (40 mg) by mouth once daily., Disp: 90 tablet, Rfl: 1    tamsulosin (Flomax) 0.4 mg 24 hr capsule, Take 1 capsule (0.4 mg) by mouth once daily., Disp: , Rfl:     triamcinolone (Kenalog) 0.1 % cream, Apply 1 Application topically 3 times a day., Disp: , Rfl:     valsartan (Diovan) 80 mg tablet, Take 1 tablet (80 mg) by mouth once daily., Disp: 90 tablet, Rfl: 1                     Assessment/Plan   1. Arteriosclerotic cardiovascular disease        2. Chronic congestive heart failure, unspecified heart failure type (CMS/HCC)        3. Essential hypertension        4. Mixed hyperlipidemia        5. Aneurysm of ascending aorta without rupture (CMS/HCC)        6. Status post angioplasty        7. Sleep apnea, unspecified type        8. Obesity (BMI 30.0-34.9)        9. Former smoker        10. Chronic bronchitis, unspecified chronic bronchitis type (CMS/HCC)                 Scribe Attestation  By signing my name below, Zaina CABRAL LPN, Scribe   attest that this documentation has been prepared under the direction and in the presence of Adebayo Alberts MD.     Provider Attestation - Scribe documentation    All medical record entries made by the Scribe were at my direction and personally dictated by me. I have reviewed the chart and agree that the record accurately reflects my personal performance of the history, physical exam, discussion and plan.

## 2024-03-26 NOTE — PATIENT INSTRUCTIONS
Please bring all medicines, vitamins, and herbal supplements with you when you come to the office.    Prescriptions will not be filled unless you are compliant with your follow up appointments or have a follow up appointment scheduled as per instruction of your physician. Refills should be requested at the time of your visit.     BMI was above normal measurement. Current weight: 117 kg (257 lb)  Weight change since last visit (-) denotes wt loss 2 lbs   Weight loss needed to achieve BMI 25: 73.1 Lbs  Weight loss needed to achieve BMI 30: 36.3 Lbs  Provided instructions on dietary changes  Provided instructions on exercise.    6 months

## 2024-03-26 NOTE — LETTER
March 26, 2024     Agustin Puga MD  [address]  [facility], [place] [address]    Patient: Lui Yu   YOB: 1945   Date of Visit: 3/26/2024       Dear Dr. Agustin Puga MD:    Thank you for referring Lui Yu to me for evaluation. Below are my notes for this consultation.  If you have questions, please do not hesitate to call me. I look forward to following your patient along with you.       Sincerely,     Adebayo Alberts MD      CC: No Recipients  ______________________________________________________________________________________    Subjective   Lui Yu is a 78 y.o. male       Chief Complaint    Follow-up          HPI   Patient is in the office for follow-up for the problems noted below with no events noted since he was last seen.  He has chronic dyspnea which is caused by his pulmonary disease.  He has gained 10 pounds from last visit due to his dietary indiscretion.  He denies any angina palpitations syncope or near syncope and no evidence of volume overload.  His lab data from several months ago were reviewed by myself and shared with the patient and there was no areas of concern noted.  Lifestyle changes to reduce his weight and stay physically active were emphasized again today.  Did not need to make any changes to his medications.    Assessment/recommendations:     1-chronic mild dyspnea which is multifactorial but currently not concerning. He does not utilize nitroglycerin  2-hyperlipidemia, on statin therapy, historically has been controlled. LDL cholesterol below 60 mg/dL  3-hypertension presently under control medical therapy with no side effects  4-COPD, this is a contributing factor the patient's symptoms of dyspnea.  5-class I obesity, recommended restrictions of calorie intake and hopefully more activities   6-severe coronary artery disease involving all 3 vessels with previous bypass surgery and only LIMA graft to LAD is open based on cardiac catheterization Susy  2020, will continue present medical therapy including ranolazine   7-status post PCI of saphenous vein graft to ramus intermedius August 2019, that bypasses closed based on the last cardiac catheterization  8-ascending aortic aneurysm that is diameter being followed noninvasively, last CT angiography in 2023 measured 4.5 cm diameter, will order a CT scan of the chest for follow-up.  9-sleep apnea on CPAP machine  Review of Systems   Respiratory:  Positive for shortness of breath.    All other systems reviewed and are negative.           Vitals:    03/26/24 0902   BP: 120/70   BP Location: Right arm   Patient Position: Sitting   Pulse: 60   Weight: 117 kg (257 lb)   Height: 1.829 m (6')        Objective   Physical Exam  Constitutional:       Appearance: Normal appearance.   HENT:      Nose: Nose normal.   Neck:      Vascular: No carotid bruit.   Cardiovascular:      Rate and Rhythm: Normal rate.      Pulses: Normal pulses.      Heart sounds: Normal heart sounds.   Pulmonary:      Effort: Pulmonary effort is normal.      Comments: Moi crackles  Abdominal:      General: Bowel sounds are normal.      Palpations: Abdomen is soft.   Musculoskeletal:         General: Normal range of motion.      Cervical back: Normal range of motion.      Right lower leg: No edema.      Left lower leg: No edema.   Skin:     General: Skin is warm and dry.   Neurological:      General: No focal deficit present.      Mental Status: He is alert.   Psychiatric:         Mood and Affect: Mood normal.         Behavior: Behavior normal.         Thought Content: Thought content normal.         Judgment: Judgment normal.         Allergies  Patient has no known allergies.     Current Medications    Current Outpatient Medications:   •  amLODIPine (Norvasc) 5 mg tablet, Take 1 tablet (5 mg) by mouth once daily., Disp: 90 tablet, Rfl: 1  •  clopidogrel (Plavix) 75 mg tablet, Take 1 tablet (75 mg) by mouth once daily., Disp: 90 tablet, Rfl: 1  •   ezetimibe (Zetia) 10 mg tablet, Take 1 tablet (10 mg) by mouth once daily., Disp: 90 tablet, Rfl: 1  •  furosemide (Lasix) 40 mg tablet, Take 1 tablet (40 mg) by mouth 3 (three) times a week. TAKE 1 TABLET BY MOUTH ON MONDAY, WEDNESDAY, AND FRIDAY, Disp: 36 tablet, Rfl: 1  •  guaiFENesin (Mucinex) 600 mg 12 hr tablet, Take 2 tablets (1,200 mg) by mouth 2 times a day. Do not crush, chew, or split., Disp: 120 tablet, Rfl: 11  •  ipratropium-albuteroL (Duo-Neb) 0.5-2.5 mg/3 mL nebulizer solution, Take 3 mL by nebulization every 4 hours if needed for wheezing., Disp: 180 mL, Rfl: 1  •  metoprolol succinate XL (Toprol-XL) 25 mg 24 hr tablet, Take 1 tablet (25 mg) by mouth once daily., Disp: 90 tablet, Rfl: 1  •  nitroglycerin (Nitrostat) 0.4 mg SL tablet, Place 1 tablet (0.4 mg) under the tongue every 5 minutes if needed for chest pain. PLACE 1 TABLET UNDER THE TONGUE EVERY 5 MINUTES FOR UP TO 3 DOSES AS NEEDED FOR CHEST PAIN.CALL 911 IF PAIN PERSISTS., Disp: , Rfl:   •  rosuvastatin (Crestor) 40 mg tablet, Take 1 tablet (40 mg) by mouth once daily., Disp: 90 tablet, Rfl: 1  •  tamsulosin (Flomax) 0.4 mg 24 hr capsule, Take 1 capsule (0.4 mg) by mouth once daily., Disp: , Rfl:   •  triamcinolone (Kenalog) 0.1 % cream, Apply 1 Application topically 3 times a day., Disp: , Rfl:   •  valsartan (Diovan) 80 mg tablet, Take 1 tablet (80 mg) by mouth once daily., Disp: 90 tablet, Rfl: 1                     Assessment/Plan   1. Arteriosclerotic cardiovascular disease        2. Chronic congestive heart failure, unspecified heart failure type (CMS/HCC)        3. Essential hypertension        4. Mixed hyperlipidemia        5. Aneurysm of ascending aorta without rupture (CMS/HCC)        6. Status post angioplasty        7. Sleep apnea, unspecified type        8. Obesity (BMI 30.0-34.9)        9. Former smoker        10. Chronic bronchitis, unspecified chronic bronchitis type (CMS/HCC)                 Scribe Attestation  By signing  my name below, I, Zaina WILSON LPN, Scribe   attest that this documentation has been prepared under the direction and in the presence of Adebayo Alberts MD.     Provider Attestation - Scribe documentation    All medical record entries made by the Scribe were at my direction and personally dictated by me. I have reviewed the chart and agree that the record accurately reflects my personal performance of the history, physical exam, discussion and plan.

## 2024-07-02 ENCOUNTER — APPOINTMENT (OUTPATIENT)
Dept: PRIMARY CARE | Facility: CLINIC | Age: 79
End: 2024-07-02
Payer: COMMERCIAL

## 2024-07-02 VITALS
HEIGHT: 72 IN | SYSTOLIC BLOOD PRESSURE: 124 MMHG | DIASTOLIC BLOOD PRESSURE: 66 MMHG | WEIGHT: 246.8 LBS | OXYGEN SATURATION: 95 % | TEMPERATURE: 98 F | BODY MASS INDEX: 33.43 KG/M2 | HEART RATE: 50 BPM

## 2024-07-02 DIAGNOSIS — Z13.29 SCREENING FOR THYROID DISORDER: ICD-10-CM

## 2024-07-02 DIAGNOSIS — Z13.89 SCREENING FOR BLOOD OR PROTEIN IN URINE: ICD-10-CM

## 2024-07-02 DIAGNOSIS — I10 ESSENTIAL HYPERTENSION: Primary | ICD-10-CM

## 2024-07-02 DIAGNOSIS — E55.9 INADEQUATE INTAKE OF CALCIUM AND VITAMIN D: ICD-10-CM

## 2024-07-02 DIAGNOSIS — Z12.11 SCREENING FOR COLON CANCER: ICD-10-CM

## 2024-07-02 DIAGNOSIS — L20.84 INTRINSIC ECZEMA: ICD-10-CM

## 2024-07-02 DIAGNOSIS — E78.2 MIXED HYPERLIPIDEMIA: ICD-10-CM

## 2024-07-02 DIAGNOSIS — E58 INADEQUATE INTAKE OF CALCIUM AND VITAMIN D: ICD-10-CM

## 2024-07-02 DIAGNOSIS — I25.10 ARTERIOSCLEROTIC CARDIOVASCULAR DISEASE: ICD-10-CM

## 2024-07-02 DIAGNOSIS — Z12.5 SCREENING FOR PROSTATE CANCER: ICD-10-CM

## 2024-07-02 PROCEDURE — 1159F MED LIST DOCD IN RCRD: CPT | Performed by: INTERNAL MEDICINE

## 2024-07-02 PROCEDURE — 99214 OFFICE O/P EST MOD 30 MIN: CPT | Performed by: INTERNAL MEDICINE

## 2024-07-02 PROCEDURE — 3078F DIAST BP <80 MM HG: CPT | Performed by: INTERNAL MEDICINE

## 2024-07-02 PROCEDURE — 1124F ACP DISCUSS-NO DSCNMKR DOCD: CPT | Performed by: INTERNAL MEDICINE

## 2024-07-02 PROCEDURE — 3074F SYST BP LT 130 MM HG: CPT | Performed by: INTERNAL MEDICINE

## 2024-07-02 RX ORDER — EZETIMIBE 10 MG/1
10 TABLET ORAL DAILY
Qty: 90 TABLET | Refills: 1 | Status: SHIPPED | OUTPATIENT
Start: 2024-07-02

## 2024-07-02 RX ORDER — ROSUVASTATIN CALCIUM 40 MG/1
40 TABLET, COATED ORAL DAILY
Qty: 90 TABLET | Refills: 1 | Status: SHIPPED | OUTPATIENT
Start: 2024-07-02

## 2024-07-02 RX ORDER — VALSARTAN 80 MG/1
80 TABLET ORAL DAILY
Qty: 90 TABLET | Refills: 1 | Status: SHIPPED | OUTPATIENT
Start: 2024-07-02

## 2024-07-02 RX ORDER — FUROSEMIDE 40 MG/1
40 TABLET ORAL 3 TIMES WEEKLY
Qty: 36 TABLET | Refills: 1 | Status: SHIPPED | OUTPATIENT
Start: 2024-07-03

## 2024-07-02 RX ORDER — AMLODIPINE BESYLATE 5 MG/1
5 TABLET ORAL DAILY
Qty: 90 TABLET | Refills: 1 | Status: SHIPPED | OUTPATIENT
Start: 2024-07-02

## 2024-07-02 RX ORDER — METOPROLOL SUCCINATE 25 MG/1
25 TABLET, EXTENDED RELEASE ORAL DAILY
Qty: 90 TABLET | Refills: 1 | Status: SHIPPED | OUTPATIENT
Start: 2024-07-02

## 2024-07-02 RX ORDER — CLOPIDOGREL BISULFATE 75 MG/1
75 TABLET ORAL DAILY
Qty: 90 TABLET | Refills: 1 | Status: SHIPPED | OUTPATIENT
Start: 2024-07-02

## 2024-07-02 RX ORDER — NITROGLYCERIN 0.4 MG/1
0.4 TABLET SUBLINGUAL EVERY 5 MIN PRN
Qty: 25 TABLET | Refills: 11 | Status: SHIPPED | OUTPATIENT
Start: 2024-07-02 | End: 2025-07-02

## 2024-07-02 RX ORDER — TRIAMCINOLONE ACETONIDE 1 MG/G
1 CREAM TOPICAL 3 TIMES DAILY
Qty: 80 G | Refills: 3 | Status: SHIPPED | OUTPATIENT
Start: 2024-07-02

## 2024-07-02 ASSESSMENT — PATIENT HEALTH QUESTIONNAIRE - PHQ9
1. LITTLE INTEREST OR PLEASURE IN DOING THINGS: NOT AT ALL
SUM OF ALL RESPONSES TO PHQ9 QUESTIONS 1 AND 2: 0
2. FEELING DOWN, DEPRESSED OR HOPELESS: NOT AT ALL

## 2024-07-09 ENCOUNTER — TELEPHONE (OUTPATIENT)
Dept: GASTROENTEROLOGY | Facility: CLINIC | Age: 79
End: 2024-07-09
Payer: COMMERCIAL

## 2024-07-09 NOTE — TELEPHONE ENCOUNTER
Left voicemail for patient regarding scheduling an appointment. Patient is over the age of 76 and must be seen in office for a consultation before any procedures can be scheduled. Provided patient with office number, [phone].

## 2024-07-28 PROBLEM — J40 BRONCHITIS: Status: RESOLVED | Noted: 2023-12-12 | Resolved: 2024-07-28

## 2024-07-28 PROBLEM — J44.1 COPD EXACERBATION (MULTI): Status: RESOLVED | Noted: 2023-12-12 | Resolved: 2024-07-28

## 2024-07-28 ASSESSMENT — ENCOUNTER SYMPTOMS
LIGHT-HEADEDNESS: 0
SORE THROAT: 0
MYALGIAS: 0
DYSURIA: 0
COUGH: 0
ACTIVITY CHANGE: 0
ABDOMINAL PAIN: 0

## 2024-07-29 NOTE — PROGRESS NOTES
CHIEF COMPLAINT:    Chief Complaint   Patient presents with    6 Month Folloe-up     Needs 90 day supply per insurance.        HISTORY OF PRESENT ILLNESS:  Lui Yu  is a pleasant 79 y.o. male Comes here for 6-month follow-up.  He has chronic medical conditions like hypertension, hyperlipidemia coronary artery disease.  He denies any chest pain or pressure.  Overall he is doing well.  He does not have any acute medical complaints he needs his medications refilled his blood pressure today in the office is 124/66 mmHg.  Patient also needs annual blood work.  He is also due for his colonoscopy for colon cancer screening.    Review of Systems   Constitutional:  Negative for activity change.   HENT:  Negative for congestion and sore throat.    Respiratory:  Negative for cough.    Cardiovascular:  Negative for chest pain.   Gastrointestinal:  Negative for abdominal pain.   Endocrine: Negative for polyuria.   Genitourinary:  Negative for dysuria.   Musculoskeletal:  Negative for myalgias.   Skin:  Negative for rash.   Neurological:  Negative for light-headedness.   Psychiatric/Behavioral:  Negative for behavioral problems.      Visit Vitals  /66 (BP Location: Left arm, Patient Position: Sitting)   Pulse 50   Temp 36.7 °C (98 °F)   Ht 1.829 m (6')   Wt 112 kg (246 lb 12.8 oz)   SpO2 95% Comment: RA   BMI 33.47 kg/m²   Smoking Status Former   BSA 2.39 m²         Wt Readings from Last 10 Encounters:   07/02/24 112 kg (246 lb 12.8 oz)   03/26/24 117 kg (257 lb)   02/08/24 116 kg (255 lb)   01/03/24 116 kg (255 lb)   07/28/23 113 kg (249 lb)   06/23/23 114 kg (251 lb 4 oz)   06/01/23 113 kg (248 lb 12.8 oz)   02/15/23 114 kg (252 lb)   01/13/23 116 kg (255 lb)   08/16/22 114 kg (252 lb)       Physical Exam  Vitals and nursing note reviewed.   Constitutional:       Appearance: Normal appearance.   HENT:      Head: Normocephalic.      Right Ear: Tympanic membrane normal.      Left Ear: Tympanic membrane normal.       Nose: Nose normal.      Mouth/Throat:      Mouth: Mucous membranes are moist.   Cardiovascular:      Rate and Rhythm: Normal rate and regular rhythm.      Pulses: Normal pulses.      Heart sounds: No murmur heard.  Pulmonary:      Effort: No respiratory distress.      Breath sounds: Normal breath sounds.   Abdominal:      Palpations: Abdomen is soft.   Musculoskeletal:      Cervical back: Neck supple.      Right lower leg: No edema.      Left lower leg: No edema.   Skin:     General: Skin is warm.      Findings: No rash.   Neurological:      General: No focal deficit present.      Mental Status: He is alert and oriented to person, place, and time.   Psychiatric:         Mood and Affect: Mood normal.        RECENT LABS:  Lab Results   Component Value Date    WBC 8.7 08/28/2023    HGB 13.6 08/28/2023    HCT 41.5 08/28/2023     08/28/2023    CHOL 117 08/28/2023    TRIG 231 (H) 08/28/2023    HDL 39.9 (A) 08/28/2023    ALT 18 08/28/2023    AST 17 08/28/2023     08/28/2023    K 4.3 08/28/2023     08/28/2023    CREATININE 1.10 08/28/2023    BUN 17 08/28/2023    CO2 24 08/28/2023    TSH 1.97 08/28/2023    PSA 1.19 08/28/2023    INR 1.0 08/29/2019     Lab Results   Component Value Date    GLUCOSE 91 08/28/2023    CALCIUM 8.9 08/28/2023     08/28/2023    K 4.3 08/28/2023    CO2 24 08/28/2023     08/28/2023    BUN 17 08/28/2023    CREATININE 1.10 08/28/2023        IMAGING:  === 03/18/21 ===  Patchy bilateral airspace disease worse at the bases. Multifocal  pneumonia including atypical/viral pneumonia is in the differential.  Chronic lung disease is also in the differential. CT can be performed  for complete evaluation depending on patient's history     MEDICATIONS:   Current Outpatient Medications   Medication Instructions    amLODIPine (NORVASC) 5 mg, oral, Daily    clopidogrel (PLAVIX) 75 mg, oral, Daily    ezetimibe (ZETIA) 10 mg, oral, Daily    furosemide (LASIX) 40 mg, oral, 3 times weekly,  TAKE 1 TABLET BY MOUTH ON MONDAY, WEDNESDAY, AND FRIDAY    guaiFENesin (MUCINEX) 1,200 mg, oral, 2 times daily, Do not crush, chew, or split.    ipratropium-albuteroL (Duo-Neb) 0.5-2.5 mg/3 mL nebulizer solution 3 mL, nebulization, Every 4 hours PRN    metoprolol succinate XL (TOPROL-XL) 25 mg, oral, Daily    nitroglycerin (NITROSTAT) 0.4 mg, sublingual, Every 5 min PRN, PLACE 1 TABLET UNDER THE TONGUE EVERY 5 MINUTES FOR UP TO 3 DOSES AS NEEDED FOR CHEST PAIN.CALL 911 IF PAIN PERSISTS.    rosuvastatin (CRESTOR) 40 mg, oral, Daily    tamsulosin (FLOMAX) 0.4 mg, oral, Daily    triamcinolone (Kenalog) 0.1 % cream 1 Application, Topical, 3 times daily    valsartan (DIOVAN) 80 mg, oral, Daily        TODAY'S VISIT  DX:   1. Essential hypertension  nitroglycerin (Nitrostat) 0.4 mg SL tablet    valsartan (Diovan) 80 mg tablet    amLODIPine (Norvasc) 5 mg tablet    furosemide (Lasix) 40 mg tablet    CBC and Auto Differential      2. Mixed hyperlipidemia  ezetimibe (Zetia) 10 mg tablet    rosuvastatin (Crestor) 40 mg tablet    Lipid Panel      3. Screening for thyroid disorder  Triiodothyronine, Free    TSH with reflex to Free T4 if abnormal      4. Inadequate intake of calcium and vitamin D  Vitamin D 25-Hydroxy,Total (for eval of Vitamin D levels)      5. Screening for blood or protein in urine  Urinalysis with Reflex Microscopic      6. Screening for prostate cancer  Prostate Specific Antigen      7. Screening for colon cancer  Colonoscopy Screening; Average Risk Patient      8. Intrinsic eczema  triamcinolone (Kenalog) 0.1 % cream      9. Arteriosclerotic cardiovascular disease  clopidogrel (Plavix) 75 mg tablet    metoprolol succinate XL (Toprol-XL) 25 mg 24 hr tablet    furosemide (Lasix) 40 mg tablet    Comprehensive Metabolic Panel           MEDICAL DECISION MAKING:  - The current and active medical co morbidities have been considered.   - Recent lab work and relevant imaging studies have been reviewed.    - Relevant  correspondence/notes from other specialty consultants were reviewed.    - Medication have been sent for refill.    - Next Follow up in 3 months  - Patient was given treatment as per above plan

## 2024-09-26 ENCOUNTER — APPOINTMENT (OUTPATIENT)
Dept: CARDIOLOGY | Facility: CLINIC | Age: 79
End: 2024-09-26
Payer: COMMERCIAL

## 2024-09-26 VITALS
HEIGHT: 72 IN | SYSTOLIC BLOOD PRESSURE: 134 MMHG | HEART RATE: 60 BPM | DIASTOLIC BLOOD PRESSURE: 70 MMHG | WEIGHT: 248 LBS | BODY MASS INDEX: 33.59 KG/M2

## 2024-09-26 DIAGNOSIS — Z87.891 FORMER SMOKER: ICD-10-CM

## 2024-09-26 DIAGNOSIS — E66.9 OBESITY (BMI 30.0-34.9): ICD-10-CM

## 2024-09-26 DIAGNOSIS — I71.21 ANEURYSM OF ASCENDING AORTA WITHOUT RUPTURE (CMS-HCC): ICD-10-CM

## 2024-09-26 DIAGNOSIS — I10 ESSENTIAL HYPERTENSION: ICD-10-CM

## 2024-09-26 DIAGNOSIS — G47.30 SLEEP APNEA, UNSPECIFIED TYPE: ICD-10-CM

## 2024-09-26 DIAGNOSIS — E78.2 MIXED HYPERLIPIDEMIA: ICD-10-CM

## 2024-09-26 DIAGNOSIS — I25.10 ARTERIOSCLEROTIC CARDIOVASCULAR DISEASE: Primary | ICD-10-CM

## 2024-09-26 DIAGNOSIS — Z98.62 STATUS POST ANGIOPLASTY: ICD-10-CM

## 2024-09-26 PROCEDURE — 99214 OFFICE O/P EST MOD 30 MIN: CPT | Performed by: INTERNAL MEDICINE

## 2024-09-26 PROCEDURE — 3078F DIAST BP <80 MM HG: CPT | Performed by: INTERNAL MEDICINE

## 2024-09-26 PROCEDURE — 1036F TOBACCO NON-USER: CPT | Performed by: INTERNAL MEDICINE

## 2024-09-26 PROCEDURE — 1159F MED LIST DOCD IN RCRD: CPT | Performed by: INTERNAL MEDICINE

## 2024-09-26 PROCEDURE — 3075F SYST BP GE 130 - 139MM HG: CPT | Performed by: INTERNAL MEDICINE

## 2024-09-26 ASSESSMENT — ENCOUNTER SYMPTOMS: SHORTNESS OF BREATH: 1

## 2024-09-26 NOTE — LETTER
September 26, 2024     Agustin Puga MD  [address]  [facility] [address]    Patient: Lui Yu   YOB: 1945   Date of Visit: 9/26/2024       Dear Dr. Agustin Puga MD:    Thank you for referring Lui Yu to me for evaluation. Below are my notes for this consultation.  If you have questions, please do not hesitate to call me. I look forward to following your patient along with you.       Sincerely,     Adebayo Alberts MD      CC: No Recipients  ______________________________________________________________________________________    Subjective   Lui Yu is a 79 y.o. male       Chief Complaint    Follow-up          HPI   Patient is in the office for follow-up for the problems noted below with no events noted since he was last seen.  He has chronic dyspnea which is multifactorial.  I reviewed with the patient his CT scan of the chest.  Back in the spring that showed no change in the size of the ascending aortic aneurysm at 4.5 cm.  He is compliant with CPAP machine.  He remains significantly obese.  Education that regard was provided.  He was supposed to have had blood work several months ago which she felt to have and we emphasized the need for testing at this time.  He will be compliant.  He had no indication of angina pectoris and has no symptoms of heart failure.  No indication of cardiac arrhythmias.  He has no claudications.    Assessment/recommendations:     1-chronic mild dyspnea which is multifactorial but currently not concerning. He does not utilize nitroglycerin  2-hyperlipidemia, on statin therapy, historically has been controlled. LDL cholesterol below 60 mg/dL  3-essential hypertension presently under control medical therapy with no side effects  4-COPD, this is a contributing factor the patient's symptoms of dyspnea.  5-class I obesity, recommended restrictions of calorie intake and hopefully more activities   6-severe coronary artery disease involving all 3  vessels with previous bypass surgery and only LIMA graft to LAD is open based on cardiac catheterization June 2020, will continue present medical therapy including ranolazine   7-status post PCI of saphenous vein graft to ramus intermedius August 2019, that bypasses closed based on the last cardiac catheterization  8-ascending aortic aneurysm that is diameter being followed noninvasively, last CT angiography in 2024 measured 4.5 cm diameter  9-sleep apnea on CPAP machine  Review of Systems   Constitutional: Positive for malaise/fatigue.   Cardiovascular:  Positive for chest pain and leg swelling.   Respiratory:  Positive for shortness of breath.    All other systems reviewed and are negative.           Vitals:    09/26/24 0831   BP: 134/70   BP Location: Left arm   Patient Position: Sitting   Pulse: 60   Weight: 112 kg (248 lb)   Height: 1.829 m (6')        Objective   Physical Exam  Constitutional:       Appearance: Normal appearance.   HENT:      Nose: Nose normal.   Neck:      Vascular: No carotid bruit.   Cardiovascular:      Rate and Rhythm: Normal rate.      Pulses: Normal pulses.      Heart sounds: Normal heart sounds.   Pulmonary:      Effort: Pulmonary effort is normal.   Abdominal:      General: Bowel sounds are normal.      Palpations: Abdomen is soft.   Musculoskeletal:         General: Normal range of motion.      Cervical back: Normal range of motion.      Right lower leg: No edema.      Left lower leg: No edema.   Skin:     General: Skin is warm and dry.   Neurological:      General: No focal deficit present.      Mental Status: He is alert.   Psychiatric:         Mood and Affect: Mood normal.         Behavior: Behavior normal.         Thought Content: Thought content normal.         Judgment: Judgment normal.         Allergies  Patient has no known allergies.     Current Medications    Current Outpatient Medications:   •  amLODIPine (Norvasc) 5 mg tablet, Take 1 tablet (5 mg) by mouth once daily.,  Disp: 90 tablet, Rfl: 1  •  clopidogrel (Plavix) 75 mg tablet, Take 1 tablet (75 mg) by mouth once daily., Disp: 90 tablet, Rfl: 1  •  ezetimibe (Zetia) 10 mg tablet, Take 1 tablet (10 mg) by mouth once daily., Disp: 90 tablet, Rfl: 1  •  furosemide (Lasix) 40 mg tablet, Take 1 tablet (40 mg) by mouth 3 (three) times a week. TAKE 1 TABLET BY MOUTH ON MONDAY, WEDNESDAY, AND FRIDAY, Disp: 36 tablet, Rfl: 1  •  ipratropium-albuteroL (Duo-Neb) 0.5-2.5 mg/3 mL nebulizer solution, Take 3 mL by nebulization every 4 hours if needed for wheezing., Disp: 180 mL, Rfl: 1  •  metoprolol succinate XL (Toprol-XL) 25 mg 24 hr tablet, Take 1 tablet (25 mg) by mouth once daily., Disp: 90 tablet, Rfl: 1  •  nitroglycerin (Nitrostat) 0.4 mg SL tablet, Place 1 tablet (0.4 mg) under the tongue every 5 minutes if needed for chest pain. PLACE 1 TABLET UNDER THE TONGUE EVERY 5 MINUTES FOR UP TO 3 DOSES AS NEEDED FOR CHEST PAIN.CALL 911 IF PAIN PERSISTS., Disp: 25 tablet, Rfl: 11  •  rosuvastatin (Crestor) 40 mg tablet, Take 1 tablet (40 mg) by mouth once daily., Disp: 90 tablet, Rfl: 1  •  triamcinolone (Kenalog) 0.1 % cream, Apply 1 Application topically 3 times a day., Disp: 80 g, Rfl: 3  •  valsartan (Diovan) 80 mg tablet, Take 1 tablet (80 mg) by mouth once daily., Disp: 90 tablet, Rfl: 1  •  guaiFENesin (Mucinex) 600 mg 12 hr tablet, Take 2 tablets (1,200 mg) by mouth 2 times a day. Do not crush, chew, or split., Disp: 120 tablet, Rfl: 11                     Assessment/Plan   1. Arteriosclerotic cardiovascular disease  Follow Up In Cardiology    Alanine Aminotransferase    Aspartate Aminotransferase    Basic Metabolic Panel    CBC    Lipid Panel    Follow Up In Cardiology    Alanine Aminotransferase    Aspartate Aminotransferase    Basic Metabolic Panel    CBC    Lipid Panel      2. Chronic congestive heart failure, unspecified heart failure type (Multi)  Basic Metabolic Panel    CBC    Basic Metabolic Panel    CBC      3. Essential  hypertension  Basic Metabolic Panel    Basic Metabolic Panel      4. Aneurysm of ascending aorta without rupture (CMS-HCC)        5. Status post angioplasty        6. Mixed hyperlipidemia  Alanine Aminotransferase    Aspartate Aminotransferase    Lipid Panel    Alanine Aminotransferase    Aspartate Aminotransferase    Lipid Panel      7. Sleep apnea, unspecified type        8. Obesity (BMI 30.0-34.9)        9. Former smoker                 Scribe Attestation  By signing my name below, Zaina CABRAL LPN   , Scribe   attest that this documentation has been prepared under the direction and in the presence of Adebayo Alberts MD.     Provider Attestation - Scribe documentation    All medical record entries made by the Scribe were at my direction and personally dictated by me. I have reviewed the chart and agree that the record accurately reflects my personal performance of the history, physical exam, discussion and plan.

## 2024-09-26 NOTE — PATIENT INSTRUCTIONS
Please bring all medicines, vitamins, and herbal supplements with you when you come to the office.    Prescriptions will not be filled unless you are compliant with your follow up appointments or have a follow up appointment scheduled as per instruction of your physician. Refills should be requested at the time of your visit.     BMI was above normal measurement. Current weight: 112 kg (248 lb)  Weight change since last visit (-) denotes wt loss 1.2 lbs   Weight loss needed to achieve BMI 25: 64.1 Lbs  Weight loss needed to achieve BMI 30: 27.3 Lbs  Provided instructions on dietary changes  Provided instructions on exercise.    Lab work  6 months

## 2024-09-26 NOTE — PROGRESS NOTES
Subjective   Lui Yu is a 79 y.o. male       Chief Complaint    Follow-up          HPI   Patient is in the office for follow-up for the problems noted below with no events noted since he was last seen.  He has chronic dyspnea which is multifactorial.  I reviewed with the patient his CT scan of the chest.  Back in the spring that showed no change in the size of the ascending aortic aneurysm at 4.5 cm.  He is compliant with CPAP machine.  He remains significantly obese.  Education that regard was provided.  He was supposed to have had blood work several months ago which she felt to have and we emphasized the need for testing at this time.  He will be compliant.  He had no indication of angina pectoris and has no symptoms of heart failure.  No indication of cardiac arrhythmias.  He has no claudications.    Assessment/recommendations:     1-chronic mild dyspnea which is multifactorial but currently not concerning. He does not utilize nitroglycerin  2-hyperlipidemia, on statin therapy, historically has been controlled. LDL cholesterol below 60 mg/dL  3-essential hypertension presently under control medical therapy with no side effects  4-COPD, this is a contributing factor the patient's symptoms of dyspnea.  5-class I obesity, recommended restrictions of calorie intake and hopefully more activities   6-severe coronary artery disease involving all 3 vessels with previous bypass surgery and only LIMA graft to LAD is open based on cardiac catheterization June 2020, will continue present medical therapy including ranolazine   7-status post PCI of saphenous vein graft to ramus intermedius August 2019, that bypasses closed based on the last cardiac catheterization  8-ascending aortic aneurysm that is diameter being followed noninvasively, last CT angiography in 2024 measured 4.5 cm diameter  9-sleep apnea on CPAP machine  Review of Systems   Constitutional: Positive for malaise/fatigue.   Cardiovascular:  Positive for  chest pain and leg swelling.   Respiratory:  Positive for shortness of breath.    All other systems reviewed and are negative.           Vitals:    09/26/24 0831   BP: 134/70   BP Location: Left arm   Patient Position: Sitting   Pulse: 60   Weight: 112 kg (248 lb)   Height: 1.829 m (6')        Objective   Physical Exam  Constitutional:       Appearance: Normal appearance.   HENT:      Nose: Nose normal.   Neck:      Vascular: No carotid bruit.   Cardiovascular:      Rate and Rhythm: Normal rate.      Pulses: Normal pulses.      Heart sounds: Normal heart sounds.   Pulmonary:      Effort: Pulmonary effort is normal.   Abdominal:      General: Bowel sounds are normal.      Palpations: Abdomen is soft.   Musculoskeletal:         General: Normal range of motion.      Cervical back: Normal range of motion.      Right lower leg: No edema.      Left lower leg: No edema.   Skin:     General: Skin is warm and dry.   Neurological:      General: No focal deficit present.      Mental Status: He is alert.   Psychiatric:         Mood and Affect: Mood normal.         Behavior: Behavior normal.         Thought Content: Thought content normal.         Judgment: Judgment normal.         Allergies  Patient has no known allergies.     Current Medications    Current Outpatient Medications:     amLODIPine (Norvasc) 5 mg tablet, Take 1 tablet (5 mg) by mouth once daily., Disp: 90 tablet, Rfl: 1    clopidogrel (Plavix) 75 mg tablet, Take 1 tablet (75 mg) by mouth once daily., Disp: 90 tablet, Rfl: 1    ezetimibe (Zetia) 10 mg tablet, Take 1 tablet (10 mg) by mouth once daily., Disp: 90 tablet, Rfl: 1    furosemide (Lasix) 40 mg tablet, Take 1 tablet (40 mg) by mouth 3 (three) times a week. TAKE 1 TABLET BY MOUTH ON MONDAY, WEDNESDAY, AND FRIDAY, Disp: 36 tablet, Rfl: 1    ipratropium-albuteroL (Duo-Neb) 0.5-2.5 mg/3 mL nebulizer solution, Take 3 mL by nebulization every 4 hours if needed for wheezing., Disp: 180 mL, Rfl: 1    metoprolol  succinate XL (Toprol-XL) 25 mg 24 hr tablet, Take 1 tablet (25 mg) by mouth once daily., Disp: 90 tablet, Rfl: 1    nitroglycerin (Nitrostat) 0.4 mg SL tablet, Place 1 tablet (0.4 mg) under the tongue every 5 minutes if needed for chest pain. PLACE 1 TABLET UNDER THE TONGUE EVERY 5 MINUTES FOR UP TO 3 DOSES AS NEEDED FOR CHEST PAIN.CALL 911 IF PAIN PERSISTS., Disp: 25 tablet, Rfl: 11    rosuvastatin (Crestor) 40 mg tablet, Take 1 tablet (40 mg) by mouth once daily., Disp: 90 tablet, Rfl: 1    triamcinolone (Kenalog) 0.1 % cream, Apply 1 Application topically 3 times a day., Disp: 80 g, Rfl: 3    valsartan (Diovan) 80 mg tablet, Take 1 tablet (80 mg) by mouth once daily., Disp: 90 tablet, Rfl: 1    guaiFENesin (Mucinex) 600 mg 12 hr tablet, Take 2 tablets (1,200 mg) by mouth 2 times a day. Do not crush, chew, or split., Disp: 120 tablet, Rfl: 11                     Assessment/Plan   1. Arteriosclerotic cardiovascular disease  Follow Up In Cardiology    Alanine Aminotransferase    Aspartate Aminotransferase    Basic Metabolic Panel    CBC    Lipid Panel    Follow Up In Cardiology    Alanine Aminotransferase    Aspartate Aminotransferase    Basic Metabolic Panel    CBC    Lipid Panel      2. Chronic congestive heart failure, unspecified heart failure type (Multi)  Basic Metabolic Panel    CBC    Basic Metabolic Panel    CBC      3. Essential hypertension  Basic Metabolic Panel    Basic Metabolic Panel      4. Aneurysm of ascending aorta without rupture (CMS-HCC)        5. Status post angioplasty        6. Mixed hyperlipidemia  Alanine Aminotransferase    Aspartate Aminotransferase    Lipid Panel    Alanine Aminotransferase    Aspartate Aminotransferase    Lipid Panel      7. Sleep apnea, unspecified type        8. Obesity (BMI 30.0-34.9)        9. Former smoker                 Scribe Attestation  By signing my name below, Zaina CABRAL LPN   , Scrjanes   attest that this documentation has been prepared under the direction  and in the presence of Adebayo Alberts MD.     Provider Attestation - Scribe documentation    All medical record entries made by the Scribe were at my direction and personally dictated by me. I have reviewed the chart and agree that the record accurately reflects my personal performance of the history, physical exam, discussion and plan.

## 2024-12-03 DIAGNOSIS — E78.2 MIXED HYPERLIPIDEMIA: ICD-10-CM

## 2024-12-03 RX ORDER — ROSUVASTATIN CALCIUM 40 MG/1
40 TABLET, COATED ORAL DAILY
Qty: 90 TABLET | Refills: 1 | Status: SHIPPED | OUTPATIENT
Start: 2024-12-03

## 2024-12-10 LAB
NON-UH HIE ALANINE AMINOTRANSFERASE:CCNC:PT:SER/PLAS:QN:NO ADDITION OF P-5': 17 INT._UNIT/L (ref 6–46)
NON-UH HIE ANION GAP:SCNC:PT:SER/PLAS:QN:: 11 MEQ/L (ref 6–16)
NON-UH HIE ASPARTATE AMINOTRANSFERASE:CCNC:PT:SER/PLAS:QN:: 17 INT._UNIT/L (ref 5–43)
NON-UH HIE CALCIUM:MCNC:PT:SER/PLAS:QN:: 9 MG/DL (ref 8.9–11.1)
NON-UH HIE CARBON DIOXIDE:SCNC:PT:SER/PLAS:QN:: 25 MMOL/L (ref 21–31)
NON-UH HIE CHLORIDE:SCNC:PT:SER/PLAS:QN:: 106 MMOL/L (ref 101–111)
NON-UH HIE CHOLESTEROL.IN HDL:MCNC:PT:SER/PLAS:QN:: 42 MG/DL
NON-UH HIE CHOLESTEROL.IN LDL:MCNC:PT:SER/PLAS:QN:: 74 MG/DL
NON-UH HIE CHOLESTEROL.IN VLDL:MCNC:PT:SER/PLAS:QN:CALCULATED: 44 MG/DL (ref 7–40)
NON-UH HIE CHOLESTEROL:MCNC:PT:SER/PLAS:QN:: 142 MG/DL (ref 120–200)
NON-UH HIE CREATININE:MCNC:PT:SER/PLAS:QN:: 1.1 MG/DL (ref 0.5–1.3)
NON-UH HIE EGFR: 69 ML/MIN/1.73 M2
NON-UH HIE ERYTHROCYTE DISTRIBUTION WIDTH:RATIO:PT:RBC:QN:AUTOMATED COUNT: 13.8 % (ref 10.9–14.2)
NON-UH HIE ERYTHROCYTE MEAN CORPUSCULAR HEMOGLOBIN CONCENTRATION:MCNC:PT:RB: 35.8 GM/DL (ref 31.4–36)
NON-UH HIE ERYTHROCYTE MEAN CORPUSCULAR HEMOGLOBIN:ENTMASS:PT:RBC:QN:AUTOMA: 33.4 PG (ref 27–34)
NON-UH HIE ERYTHROCYTE MEAN CORPUSCULAR VOLUME:ENTVOL:PT:RBC:QN:AUTOMATED C: 93.2 FL (ref 80–100)
NON-UH HIE ERYTHROCYTE SIZE:MORPH:PT:BLD:NOM:: NORMAL
NON-UH HIE ERYTHROCYTES:NCNC:PT:BLD:QN:AUTOMATED COUNT: 4.2 E12/L (ref 4.3–5.9)
NON-UH HIE GLUCOSE:MCNC:PT:SER/PLAS:QN:: 96 MG/DL (ref 55–199)
NON-UH HIE HEMATOCRIT:VFR:PT:BLD:QN:AUTOMATED COUNT: 39.4 % (ref 37.7–49)
NON-UH HIE HEMOGLOBIN:MCNC:PT:BLD:QN:: 14.1 GM/DL (ref 13.5–17.5)
NON-UH HIE LEUKOCYTES: 8.3 E9/L (ref 4–11)
NON-UH HIE PLATELET MEAN VOLUME:ENTVOL:PT:BLD:QN:AUTOMATED COUNT: 6.8 FL (ref 6.4–10.8)
NON-UH HIE PLATELET: 237 E9/L (ref 150–500)
NON-UH HIE POTASSIUM:SCNC:PT:SER/PLAS:QN:: 4.1 MMOL/L (ref 3.5–5.3)
NON-UH HIE SODIUM:SCNC:PT:SER/PLAS:QN:: 138 MMOL/L (ref 135–145)
NON-UH HIE TRIGLYCERIDE:MCNC:PT:SER/PLAS:QN:: 220 MG/DL
NON-UH HIE UREA NITROGEN/CREATININE:MRTO:PT:SER/PLAS:QN:: 20 NO UNITS (ref 10–20)
NON-UH HIE UREA NITROGEN:MCNC:PT:SER/PLAS:QN:: 22 MG/DL (ref 5–21)

## 2024-12-27 DIAGNOSIS — I25.10 ARTERIOSCLEROTIC CARDIOVASCULAR DISEASE: ICD-10-CM

## 2024-12-31 RX ORDER — CLOPIDOGREL BISULFATE 75 MG/1
75 TABLET ORAL DAILY
Qty: 90 TABLET | Refills: 1 | Status: SHIPPED | OUTPATIENT
Start: 2024-12-31

## 2025-01-08 ENCOUNTER — APPOINTMENT (OUTPATIENT)
Dept: PRIMARY CARE | Facility: CLINIC | Age: 80
End: 2025-01-08
Payer: COMMERCIAL

## 2025-01-10 DIAGNOSIS — I25.10 ARTERIOSCLEROTIC CARDIOVASCULAR DISEASE: ICD-10-CM

## 2025-01-10 DIAGNOSIS — I10 ESSENTIAL HYPERTENSION: ICD-10-CM

## 2025-01-10 DIAGNOSIS — L20.84 INTRINSIC ECZEMA: ICD-10-CM

## 2025-01-10 DIAGNOSIS — E78.2 MIXED HYPERLIPIDEMIA: ICD-10-CM

## 2025-01-13 RX ORDER — EZETIMIBE 10 MG/1
10 TABLET ORAL DAILY
Qty: 90 TABLET | Refills: 1 | Status: SHIPPED | OUTPATIENT
Start: 2025-01-13

## 2025-01-13 RX ORDER — AMLODIPINE BESYLATE 5 MG/1
5 TABLET ORAL DAILY
Qty: 90 TABLET | Refills: 1 | Status: SHIPPED | OUTPATIENT
Start: 2025-01-13

## 2025-01-13 RX ORDER — FUROSEMIDE 40 MG/1
40 TABLET ORAL 3 TIMES WEEKLY
Qty: 36 TABLET | Refills: 1 | Status: SHIPPED | OUTPATIENT
Start: 2025-01-13

## 2025-01-13 RX ORDER — METOPROLOL SUCCINATE 25 MG/1
25 TABLET, EXTENDED RELEASE ORAL DAILY
Qty: 90 TABLET | Refills: 1 | Status: SHIPPED | OUTPATIENT
Start: 2025-01-13

## 2025-01-13 RX ORDER — TRIAMCINOLONE ACETONIDE 1 MG/G
1 CREAM TOPICAL 3 TIMES DAILY
Qty: 80 G | Refills: 3 | Status: SHIPPED | OUTPATIENT
Start: 2025-01-13

## 2025-01-13 RX ORDER — VALSARTAN 80 MG/1
80 TABLET ORAL DAILY
Qty: 90 TABLET | Refills: 1 | Status: SHIPPED | OUTPATIENT
Start: 2025-01-13

## 2025-01-14 ENCOUNTER — APPOINTMENT (OUTPATIENT)
Dept: PRIMARY CARE | Facility: CLINIC | Age: 80
End: 2025-01-14
Payer: COMMERCIAL

## 2025-01-14 VITALS
BODY MASS INDEX: 33.86 KG/M2 | SYSTOLIC BLOOD PRESSURE: 120 MMHG | OXYGEN SATURATION: 95 % | DIASTOLIC BLOOD PRESSURE: 68 MMHG | HEART RATE: 53 BPM | TEMPERATURE: 98 F | WEIGHT: 250 LBS | HEIGHT: 72 IN

## 2025-01-14 DIAGNOSIS — E55.9 INADEQUATE INTAKE OF CALCIUM AND VITAMIN D: ICD-10-CM

## 2025-01-14 DIAGNOSIS — E58 INADEQUATE INTAKE OF CALCIUM AND VITAMIN D: ICD-10-CM

## 2025-01-14 DIAGNOSIS — Z12.5 SCREENING FOR PROSTATE CANCER: ICD-10-CM

## 2025-01-14 DIAGNOSIS — J96.02 ACUTE RESPIRATORY FAILURE WITH HYPOXIA AND HYPERCAPNIA (MULTI): ICD-10-CM

## 2025-01-14 DIAGNOSIS — Z00.00 ENCOUNTER FOR SUBSEQUENT ANNUAL WELLNESS VISIT (AWV) IN MEDICARE PATIENT: Primary | ICD-10-CM

## 2025-01-14 DIAGNOSIS — J44.1 CHRONIC OBSTRUCTIVE PULMONARY DISEASE WITH ACUTE EXACERBATION (MULTI): ICD-10-CM

## 2025-01-14 DIAGNOSIS — Z13.89 SCREENING FOR BLOOD OR PROTEIN IN URINE: ICD-10-CM

## 2025-01-14 DIAGNOSIS — Z13.29 SCREENING FOR THYROID DISORDER: ICD-10-CM

## 2025-01-14 DIAGNOSIS — I50.9 CHRONIC CONGESTIVE HEART FAILURE, UNSPECIFIED HEART FAILURE TYPE: ICD-10-CM

## 2025-01-14 DIAGNOSIS — E78.2 MIXED HYPERLIPIDEMIA: ICD-10-CM

## 2025-01-14 DIAGNOSIS — J96.01 ACUTE RESPIRATORY FAILURE WITH HYPOXIA AND HYPERCAPNIA (MULTI): ICD-10-CM

## 2025-01-14 PROBLEM — D22.9 ATYPICAL MOLE: Status: RESOLVED | Noted: 2023-12-12 | Resolved: 2025-01-14

## 2025-01-14 PROBLEM — E66.811 OBESITY (BMI 30.0-34.9): Status: RESOLVED | Noted: 2023-12-12 | Resolved: 2025-01-14

## 2025-01-14 PROCEDURE — G0439 PPPS, SUBSEQ VISIT: HCPCS | Performed by: INTERNAL MEDICINE

## 2025-01-14 PROCEDURE — 1170F FXNL STATUS ASSESSED: CPT | Performed by: INTERNAL MEDICINE

## 2025-01-14 PROCEDURE — 3078F DIAST BP <80 MM HG: CPT | Performed by: INTERNAL MEDICINE

## 2025-01-14 PROCEDURE — 1036F TOBACCO NON-USER: CPT | Performed by: INTERNAL MEDICINE

## 2025-01-14 PROCEDURE — 3074F SYST BP LT 130 MM HG: CPT | Performed by: INTERNAL MEDICINE

## 2025-01-14 PROCEDURE — 99214 OFFICE O/P EST MOD 30 MIN: CPT | Performed by: INTERNAL MEDICINE

## 2025-01-14 PROCEDURE — 1159F MED LIST DOCD IN RCRD: CPT | Performed by: INTERNAL MEDICINE

## 2025-01-14 PROCEDURE — G0444 DEPRESSION SCREEN ANNUAL: HCPCS | Performed by: INTERNAL MEDICINE

## 2025-01-14 PROCEDURE — 1160F RVW MEDS BY RX/DR IN RCRD: CPT | Performed by: INTERNAL MEDICINE

## 2025-01-14 ASSESSMENT — ENCOUNTER SYMPTOMS
COUGH: 0
LOSS OF SENSATION IN FEET: 1
MYALGIAS: 0
DEPRESSION: 0
LIGHT-HEADEDNESS: 0
ABDOMINAL PAIN: 0
DYSURIA: 0
ACTIVITY CHANGE: 0
SORE THROAT: 0
OCCASIONAL FEELINGS OF UNSTEADINESS: 0

## 2025-01-14 ASSESSMENT — ACTIVITIES OF DAILY LIVING (ADL)
DRESSING: INDEPENDENT
TAKING_MEDICATION: INDEPENDENT
DOING_HOUSEWORK: INDEPENDENT
BATHING: INDEPENDENT
GROCERY_SHOPPING: INDEPENDENT
MANAGING_FINANCES: INDEPENDENT

## 2025-01-14 ASSESSMENT — PATIENT HEALTH QUESTIONNAIRE - PHQ9
SUM OF ALL RESPONSES TO PHQ9 QUESTIONS 1 AND 2: 0
2. FEELING DOWN, DEPRESSED OR HOPELESS: NOT AT ALL
1. LITTLE INTEREST OR PLEASURE IN DOING THINGS: NOT AT ALL

## 2025-01-14 NOTE — PROGRESS NOTES
CHIEF COMPLAINT:   Annual Wellness Checkup       HISTORY OF PRESENT ILLNESS:   Lui Yu comes for annual medical check up.  Otherwise doing well. No acute medical complaint today. Chronic medical conditions are stable with current medical management. Cognitive function is assessed. Immunizations are age appropriate. Home medications have been reconciled. The healthy lifestyle has been reinforced. Encouraged continued avoidance of tobacco, alcohol, poly pharmacy and substances. Functional capacity has been assessed. Discussed about fall risk and safety measures, at home and outside.  Age appropriate cancer screening tests were recommended. Reminded about code status and health care Power of  (POA). Discussed about mental health and wellbeing after reviewing depression screening questionnaire  (PHQ 9) with the patient for 5 mins. Vital signs, recent lab results, imaging studies were reviewed. Patient wanted to discuss about COPD exacerbation, coronary artery disease, congestive heart failure and mixed hyperlipidemia.  So a complete physical exams was performed.       Review of Systems   Constitutional:  Negative for activity change.   HENT:  Negative for congestion and sore throat.    Respiratory:  Negative for cough.    Cardiovascular:  Negative for chest pain.   Gastrointestinal:  Negative for abdominal pain.   Endocrine: Negative for polyuria.   Genitourinary:  Negative for dysuria.   Musculoskeletal:  Negative for myalgias.   Skin:  Negative for rash.   Neurological:  Negative for light-headedness.   Psychiatric/Behavioral:  Negative for behavioral problems.        Visit Vitals  /68 (BP Location: Left arm, Patient Position: Sitting, BP Cuff Size: Large adult)   Pulse 53   Temp 36.7 °C (98 °F) (Temporal)   Ht 1.829 m (6')   Wt 113 kg (250 lb)   SpO2 95%   BMI 33.91 kg/m²   Smoking Status Former   BSA 2.4 m²           Wt Readings from Last 10 Encounters:   01/14/25 113 kg (250 lb)   09/26/24 112  kg (248 lb)   07/02/24 112 kg (246 lb 12.8 oz)   03/26/24 117 kg (257 lb)   02/08/24 116 kg (255 lb)   01/03/24 116 kg (255 lb)   07/28/23 113 kg (249 lb)   06/23/23 114 kg (251 lb 4 oz)   06/01/23 113 kg (248 lb 12.8 oz)   02/15/23 114 kg (252 lb)        Physical Exam  Vitals and nursing note reviewed.   Constitutional:       Appearance: Normal appearance.   HENT:      Head: Normocephalic.      Right Ear: Tympanic membrane normal.      Left Ear: Tympanic membrane normal.      Nose: Nose normal.      Mouth/Throat:      Mouth: Mucous membranes are moist.   Cardiovascular:      Rate and Rhythm: Normal rate and regular rhythm.      Pulses: Normal pulses.      Heart sounds: No murmur heard.  Pulmonary:      Effort: No respiratory distress.      Breath sounds: Normal breath sounds.   Abdominal:      Palpations: Abdomen is soft.   Musculoskeletal:      Cervical back: Neck supple.      Right lower leg: No edema.      Left lower leg: No edema.   Skin:     General: Skin is warm.      Findings: No rash.   Neurological:      General: No focal deficit present.      Mental Status: He is alert and oriented to person, place, and time.   Psychiatric:         Mood and Affect: Mood normal.            RECENT LABS:  Lab Results   Component Value Date    WBC 8.7 08/28/2023    HGB 13.6 08/28/2023    HCT 41.5 08/28/2023     08/28/2023    CHOL 117 08/28/2023    TRIG 231 (H) 08/28/2023    HDL 39.9 (A) 08/28/2023    ALT 18 08/28/2023    AST 17 08/28/2023     08/28/2023    K 4.3 08/28/2023     08/28/2023    CREATININE 1.10 08/28/2023    BUN 17 08/28/2023    CO2 24 08/28/2023    TSH 1.97 08/28/2023    PSA 1.19 08/28/2023    INR 1.0 08/29/2019     Lab Results   Component Value Date    GLUCOSE 91 08/28/2023    CALCIUM 8.9 08/28/2023     08/28/2023    K 4.3 08/28/2023    CO2 24 08/28/2023     08/28/2023    BUN 17 08/28/2023    CREATININE 1.10 08/28/2023        Lab Results   Component Value Date    CREATININE 1.10  08/28/2023       MEDICATIONS:   Current Outpatient Medications   Medication Instructions    amLODIPine (NORVASC) 5 mg, oral, Daily    clopidogrel (PLAVIX) 75 mg, oral, Daily    ezetimibe (ZETIA) 10 mg, oral, Daily    furosemide (LASIX) 40 mg, oral, 3 times weekly, TAKE 1 TABLET BY MOUTH ON MONDAY, WEDNESDAY, AND FRIDAY    guaiFENesin (MUCINEX) 1,200 mg, oral, 2 times daily, Do not crush, chew, or split.    ipratropium-albuteroL (Duo-Neb) 0.5-2.5 mg/3 mL nebulizer solution 3 mL, nebulization, Every 4 hours PRN    metoprolol succinate XL (TOPROL-XL) 25 mg, oral, Daily    nitroglycerin (NITROSTAT) 0.4 mg, sublingual, Every 5 min PRN, PLACE 1 TABLET UNDER THE TONGUE EVERY 5 MINUTES FOR UP TO 3 DOSES AS NEEDED FOR CHEST PAIN.CALL 911 IF PAIN PERSISTS.    rosuvastatin (CRESTOR) 40 mg, oral, Daily    triamcinolone (Kenalog) 0.1 % cream 1 Application, Topical, 3 times daily    valsartan (DIOVAN) 80 mg, oral, Daily        TODAY'S VISIT  DX:     1. Encounter for subsequent annual wellness visit (AWV) in Medicare patient  Overall health is stable and at base line. Will continue with current medical therapy and plan.  Immunizations, cancer screening tests are age appropriately up to date.      2. Acute respiratory failure with hypoxia and hypercapnia (Multi)  Comprehensive Metabolic Panel      3. Chronic congestive heart failure, unspecified heart failure type  CBC and Auto Differential      4. Chronic obstructive pulmonary disease with acute exacerbation (Multi)  Stable at this time.  Currently non-smoker.  Does not use frequent rescue inhaler.      5. Screening for thyroid disorder  Triiodothyronine, Free    TSH with reflex to Free T4 if abnormal      6. Inadequate intake of calcium and vitamin D  Vitamin D 25-Hydroxy,Total (for eval of Vitamin D levels)      7. Screening for blood or protein in urine  Urinalysis with Reflex Microscopic      8. Screening for prostate cancer  Prostate Specific Antigen      9. Mixed  hyperlipidemia  Lipid Panel             MEDICAL DECISION MAKING:   - Relevant correspondence/notes from specialty consultants were reviewed.  - Active co morbidities conditions are stable for now.    - Cognitive function stable.    - Immunizations are age appropriately up to date.   - Preventative cancer screening tests are up-to-date.    - F/U in 6-month      PS: This note was completed using Dragon voice recognition technology and may include unintended errors with respect to translation of words, typographical errors or grammar errors which may not have been identified while finalizing the chart.

## 2025-01-21 ENCOUNTER — TELEPHONE (OUTPATIENT)
Dept: PRIMARY CARE | Facility: CLINIC | Age: 80
End: 2025-01-21
Payer: COMMERCIAL

## 2025-01-21 DIAGNOSIS — G47.33 OBSTRUCTIVE SLEEP APNEA SYNDROME: ICD-10-CM

## 2025-01-21 NOTE — TELEPHONE ENCOUNTER
Patient was in office on 01/14/2025 and he had advised when he was walking out that he forgot to tell Dr. Puga that he needed an order for a new cpap machine and supplies.   Sleep Study was done at [facility] 5-9 years ago per patient.   Results found and printed.   Patient would like new order to be faxed to [facility].       Order pended

## 2025-02-03 ENCOUNTER — TELEPHONE (OUTPATIENT)
Dept: PRIMARY CARE | Facility: CLINIC | Age: 80
End: 2025-02-03
Payer: COMMERCIAL

## 2025-04-29 ENCOUNTER — APPOINTMENT (OUTPATIENT)
Dept: CARDIOLOGY | Facility: CLINIC | Age: 80
End: 2025-04-29
Payer: COMMERCIAL

## 2025-04-29 VITALS
SYSTOLIC BLOOD PRESSURE: 112 MMHG | HEART RATE: 60 BPM | BODY MASS INDEX: 32.78 KG/M2 | WEIGHT: 242 LBS | DIASTOLIC BLOOD PRESSURE: 68 MMHG | HEIGHT: 72 IN

## 2025-04-29 DIAGNOSIS — E66.811 OBESITY, CLASS I, BMI 30-34.9: ICD-10-CM

## 2025-04-29 DIAGNOSIS — Z87.891 FORMER SMOKER: ICD-10-CM

## 2025-04-29 DIAGNOSIS — Z98.62 STATUS POST ANGIOPLASTY: ICD-10-CM

## 2025-04-29 DIAGNOSIS — J44.9 CHRONIC OBSTRUCTIVE PULMONARY DISEASE, UNSPECIFIED COPD TYPE (MULTI): ICD-10-CM

## 2025-04-29 DIAGNOSIS — G47.33 OBSTRUCTIVE SLEEP APNEA SYNDROME: ICD-10-CM

## 2025-04-29 DIAGNOSIS — E78.2 MIXED HYPERLIPIDEMIA: ICD-10-CM

## 2025-04-29 DIAGNOSIS — I25.10 ARTERIOSCLEROTIC CARDIOVASCULAR DISEASE: Primary | ICD-10-CM

## 2025-04-29 DIAGNOSIS — Z95.1 S/P CABG (CORONARY ARTERY BYPASS GRAFT): ICD-10-CM

## 2025-04-29 DIAGNOSIS — R06.09 DYSPNEA ON EXERTION: ICD-10-CM

## 2025-04-29 DIAGNOSIS — I10 ESSENTIAL HYPERTENSION: ICD-10-CM

## 2025-04-29 PROCEDURE — 99214 OFFICE O/P EST MOD 30 MIN: CPT | Performed by: INTERNAL MEDICINE

## 2025-04-29 PROCEDURE — 1159F MED LIST DOCD IN RCRD: CPT | Performed by: INTERNAL MEDICINE

## 2025-04-29 PROCEDURE — 1036F TOBACCO NON-USER: CPT | Performed by: INTERNAL MEDICINE

## 2025-04-29 PROCEDURE — 3074F SYST BP LT 130 MM HG: CPT | Performed by: INTERNAL MEDICINE

## 2025-04-29 PROCEDURE — 3078F DIAST BP <80 MM HG: CPT | Performed by: INTERNAL MEDICINE

## 2025-04-29 RX ORDER — METOPROLOL SUCCINATE 25 MG/1
25 TABLET, EXTENDED RELEASE ORAL DAILY
Qty: 90 TABLET | Refills: 3 | Status: SHIPPED | OUTPATIENT
Start: 2025-04-29

## 2025-04-29 RX ORDER — AMLODIPINE BESYLATE 5 MG/1
5 TABLET ORAL DAILY
Qty: 90 TABLET | Refills: 3 | Status: SHIPPED | OUTPATIENT
Start: 2025-04-29

## 2025-04-29 RX ORDER — CLOPIDOGREL BISULFATE 75 MG/1
75 TABLET ORAL DAILY
Qty: 90 TABLET | Refills: 3 | Status: SHIPPED | OUTPATIENT
Start: 2025-04-29

## 2025-04-29 RX ORDER — EZETIMIBE 10 MG/1
10 TABLET ORAL DAILY
Qty: 90 TABLET | Refills: 3 | Status: SHIPPED | OUTPATIENT
Start: 2025-04-29

## 2025-04-29 RX ORDER — ROSUVASTATIN CALCIUM 40 MG/1
40 TABLET, COATED ORAL DAILY
Qty: 90 TABLET | Refills: 3 | Status: SHIPPED | OUTPATIENT
Start: 2025-04-29

## 2025-04-29 RX ORDER — NITROGLYCERIN 0.4 MG/1
0.4 TABLET SUBLINGUAL EVERY 5 MIN PRN
Qty: 100 TABLET | Refills: 1 | Status: SHIPPED | OUTPATIENT
Start: 2025-04-29 | End: 2026-04-29

## 2025-04-29 RX ORDER — FUROSEMIDE 40 MG/1
40 TABLET ORAL 3 TIMES WEEKLY
Qty: 36 TABLET | Refills: 3 | Status: SHIPPED | OUTPATIENT
Start: 2025-04-30 | End: 2026-04-30

## 2025-04-29 RX ORDER — VALSARTAN 80 MG/1
80 TABLET ORAL DAILY
Qty: 90 TABLET | Refills: 3 | Status: SHIPPED | OUTPATIENT
Start: 2025-04-29

## 2025-04-29 NOTE — LETTER
April 29, 2025     Agustin Puga MD  [address]  [facility] [address]    Patient: Lui Yu   YOB: 1945   Date of Visit: 4/29/2025       Dear Dr. Agustin Puga MD:    Thank you for referring Lui Yu to me for evaluation. Below are my notes for this consultation.  If you have questions, please do not hesitate to call me. I look forward to following your patient along with you.       Sincerely,     Adebayo Alberts MD      CC: No Recipients  ______________________________________________________________________________________    Chief Complaint   Patient presents with   • Follow-up     6 month Follow up for Coronary Artery Disease        Subjective   Lui Yu is a 79 y.o. male     HPI   Patient is in the office for follow-up for CAD with previous bypass surgery and angioplasty who does have obstructive sleep apnea compliant with CPAP machine.  He does have COPD and essential hypertension.  His lipid therapy has been very effective and the patient currently has no complaint of angina pectoris orthopnea PND or lower extremity edema he does have occasional dyspnea on exertion.  His weight nearly 8 pounds below his previous weight which is encouraging.  He maintains active lifestyle.  His examination was only remarkable for class I obesity.  Lab data from January 2025 were reviewed and shared with him, LDL cholesterol just over 70 mg/dL while he is on maximal dose of rosuvastatin plus ezetimibe.    Assessment/recommendations:     1-chronic mild dyspnea which is multifactorial but currently not concerning. He does not utilize nitroglycerin  2-hyperlipidemia, LDL cholesterol in January 2025 was 74 mg/dL on maximal dose rosuvastatin plus ezetimibe.  No changes are needed.  3-essential hypertension presently under control medical therapy with no side effects  4-COPD, this is a contributing factor the patient's symptoms of dyspnea.  5-class I obesity, his weight dropped nearly 8 pounds  since his last visit which is encouraging.  More weight loss was recommended  6-severe coronary artery disease involving all 3 vessels with previous bypass surgery and only LIMA graft to LAD is open based on cardiac catheterization June 2020, will continue present medical therapy including ranolazine   7-status post PCI of saphenous vein graft to ramus intermedius August 2019, that bypasses closed based on the last cardiac catheterization  8-ascending aortic aneurysm that is diameter being followed noninvasively, last CT angiography in 2024 measured 4.5 cm diameter  9-obstructive sleep apnea on CPAP machine, more weight loss is recommended  Review of Systems   All other systems reviewed and are negative.           Vitals:    04/29/25 0902   BP: 112/68   BP Location: Left arm   Patient Position: Sitting   Pulse: 60   Weight: 110 kg (242 lb)   Height: 1.829 m (6')        Objective   Physical Exam  Constitutional:       Appearance: Normal appearance.   HENT:      Nose: Nose normal.   Neck:      Vascular: No carotid bruit.   Cardiovascular:      Rate and Rhythm: Normal rate.      Pulses: Normal pulses.      Heart sounds: Normal heart sounds.   Pulmonary:      Effort: Pulmonary effort is normal.   Abdominal:      General: Bowel sounds are normal.      Palpations: Abdomen is soft.   Musculoskeletal:         General: Normal range of motion.      Cervical back: Normal range of motion.      Right lower leg: No edema.      Left lower leg: No edema.   Skin:     General: Skin is warm and dry.   Neurological:      General: No focal deficit present.      Mental Status: He is alert.   Psychiatric:         Mood and Affect: Mood normal.         Behavior: Behavior normal.         Thought Content: Thought content normal.         Judgment: Judgment normal.         Allergies  Patient has no known allergies.     Current Medications  Current Outpatient Medications   Medication Instructions   • amLODIPine (NORVASC) 5 mg, oral, Daily   •  clopidogrel (PLAVIX) 75 mg, oral, Daily   • ezetimibe (ZETIA) 10 mg, oral, Daily   • [START ON 4/30/2025] furosemide (LASIX) 40 mg, oral, 3 times weekly, TAKE 1 TABLET BY MOUTH ON MONDAY, WEDNESDAY, AND FRIDAY   • ipratropium-albuteroL (Duo-Neb) 0.5-2.5 mg/3 mL nebulizer solution 3 mL, nebulization, Every 4 hours PRN   • metoprolol succinate XL (TOPROL-XL) 25 mg, oral, Daily   • nitroglycerin (NITROSTAT) 0.4 mg, sublingual, Every 5 min PRN, PLACE 1 TABLET UNDER THE TONGUE EVERY 5 MINUTES FOR UP TO 3 DOSES AS NEEDED FOR CHEST PAIN.CALL 911 IF PAIN PERSISTS.   • rosuvastatin (CRESTOR) 40 mg, oral, Daily   • triamcinolone (Kenalog) 0.1 % cream 1 Application, Topical, 3 times daily   • valsartan (DIOVAN) 80 mg, oral, Daily                        Assessment/Plan   1. Arteriosclerotic cardiovascular disease  Follow Up In Cardiology    Follow Up In Cardiology    clopidogrel (Plavix) 75 mg tablet    metoprolol succinate XL (Toprol-XL) 25 mg 24 hr tablet    furosemide (Lasix) 40 mg tablet      2. Status post angioplasty        3. Essential hypertension  nitroglycerin (Nitrostat) 0.4 mg SL tablet    valsartan (Diovan) 80 mg tablet    amLODIPine (Norvasc) 5 mg tablet    furosemide (Lasix) 40 mg tablet      4. Mixed hyperlipidemia  ezetimibe (Zetia) 10 mg tablet    rosuvastatin (Crestor) 40 mg tablet      5. S/P CABG (coronary artery bypass graft)        6. Obstructive sleep apnea syndrome        7. Chronic obstructive pulmonary disease, unspecified COPD type (Multi)        8. Former smoker        9. Dyspnea on exertion        10. Obesity, Class I, BMI 30-34.9                 Scribe Attestation  By signing my name below, I, Negro Bearden LPN   attest that this documentation has been prepared under the direction and in the presence of Adebayo Alberts MD.     Provider Attestation - Scribe documentation    All medical record entries made by the Scribe were at my direction and personally dictated by me. I have reviewed the  chart and agree that the record accurately reflects my personal performance of the history, physical exam, discussion and plan.

## 2025-04-29 NOTE — PATIENT INSTRUCTIONS
Please bring all medicines, vitamins, and herbal supplements with you when you come to the office.    Prescriptions will not be filled unless you are compliant with your follow up appointments or have a follow up appointment scheduled as per instruction of your physician. Refills should be requested at the time of your visit.     BMI was above normal measurement. Current weight: 110 kg (242 lb)  Weight change since last visit (-) denotes wt loss -8 lbs   Weight loss needed to achieve BMI 25: 58.1 Lbs  Weight loss needed to achieve BMI 30: 21.3 Lbs  Provided instructions on dietary changes.

## 2025-04-29 NOTE — PROGRESS NOTES
Chief Complaint   Patient presents with    Follow-up     6 month Follow up for Coronary Artery Disease        Subjective   Lui Yu is a 79 y.o. male     HPI   Patient is in the office for follow-up for CAD with previous bypass surgery and angioplasty who does have obstructive sleep apnea compliant with CPAP machine.  He does have COPD and essential hypertension.  His lipid therapy has been very effective and the patient currently has no complaint of angina pectoris orthopnea PND or lower extremity edema he does have occasional dyspnea on exertion.  His weight nearly 8 pounds below his previous weight which is encouraging.  He maintains active lifestyle.  His examination was only remarkable for class I obesity.  Lab data from January 2025 were reviewed and shared with him, LDL cholesterol just over 70 mg/dL while he is on maximal dose of rosuvastatin plus ezetimibe.    Assessment/recommendations:     1-chronic mild dyspnea which is multifactorial but currently not concerning. He does not utilize nitroglycerin  2-hyperlipidemia, LDL cholesterol in January 2025 was 74 mg/dL on maximal dose rosuvastatin plus ezetimibe.  No changes are needed.  3-essential hypertension presently under control medical therapy with no side effects  4-COPD, this is a contributing factor the patient's symptoms of dyspnea.  5-class I obesity, his weight dropped nearly 8 pounds since his last visit which is encouraging.  More weight loss was recommended  6-severe coronary artery disease involving all 3 vessels with previous bypass surgery and only LIMA graft to LAD is open based on cardiac catheterization June 2020, will continue present medical therapy including ranolazine   7-status post PCI of saphenous vein graft to ramus intermedius August 2019, that bypasses closed based on the last cardiac catheterization  8-ascending aortic aneurysm that is diameter being followed noninvasively, last CT angiography in 2024 measured 4.5 cm  diameter  9-obstructive sleep apnea on CPAP machine, more weight loss is recommended  Review of Systems   All other systems reviewed and are negative.           Vitals:    04/29/25 0902   BP: 112/68   BP Location: Left arm   Patient Position: Sitting   Pulse: 60   Weight: 110 kg (242 lb)   Height: 1.829 m (6')        Objective   Physical Exam  Constitutional:       Appearance: Normal appearance.   HENT:      Nose: Nose normal.   Neck:      Vascular: No carotid bruit.   Cardiovascular:      Rate and Rhythm: Normal rate.      Pulses: Normal pulses.      Heart sounds: Normal heart sounds.   Pulmonary:      Effort: Pulmonary effort is normal.   Abdominal:      General: Bowel sounds are normal.      Palpations: Abdomen is soft.   Musculoskeletal:         General: Normal range of motion.      Cervical back: Normal range of motion.      Right lower leg: No edema.      Left lower leg: No edema.   Skin:     General: Skin is warm and dry.   Neurological:      General: No focal deficit present.      Mental Status: He is alert.   Psychiatric:         Mood and Affect: Mood normal.         Behavior: Behavior normal.         Thought Content: Thought content normal.         Judgment: Judgment normal.         Allergies  Patient has no known allergies.     Current Medications  Current Outpatient Medications   Medication Instructions    amLODIPine (NORVASC) 5 mg, oral, Daily    clopidogrel (PLAVIX) 75 mg, oral, Daily    ezetimibe (ZETIA) 10 mg, oral, Daily    [START ON 4/30/2025] furosemide (LASIX) 40 mg, oral, 3 times weekly, TAKE 1 TABLET BY MOUTH ON MONDAY, WEDNESDAY, AND FRIDAY    ipratropium-albuteroL (Duo-Neb) 0.5-2.5 mg/3 mL nebulizer solution 3 mL, nebulization, Every 4 hours PRN    metoprolol succinate XL (TOPROL-XL) 25 mg, oral, Daily    nitroglycerin (NITROSTAT) 0.4 mg, sublingual, Every 5 min PRN, PLACE 1 TABLET UNDER THE TONGUE EVERY 5 MINUTES FOR UP TO 3 DOSES AS NEEDED FOR CHEST PAIN.CALL 911 IF PAIN PERSISTS.     rosuvastatin (CRESTOR) 40 mg, oral, Daily    triamcinolone (Kenalog) 0.1 % cream 1 Application, Topical, 3 times daily    valsartan (DIOVAN) 80 mg, oral, Daily                        Assessment/Plan   1. Arteriosclerotic cardiovascular disease  Follow Up In Cardiology    Follow Up In Cardiology    clopidogrel (Plavix) 75 mg tablet    metoprolol succinate XL (Toprol-XL) 25 mg 24 hr tablet    furosemide (Lasix) 40 mg tablet      2. Status post angioplasty        3. Essential hypertension  nitroglycerin (Nitrostat) 0.4 mg SL tablet    valsartan (Diovan) 80 mg tablet    amLODIPine (Norvasc) 5 mg tablet    furosemide (Lasix) 40 mg tablet      4. Mixed hyperlipidemia  ezetimibe (Zetia) 10 mg tablet    rosuvastatin (Crestor) 40 mg tablet      5. S/P CABG (coronary artery bypass graft)        6. Obstructive sleep apnea syndrome        7. Chronic obstructive pulmonary disease, unspecified COPD type (Multi)        8. Former smoker        9. Dyspnea on exertion        10. Obesity, Class I, BMI 30-34.9                 Scribe Attestation  By signing my name below, I, Alisson CAPPS LPN  , Scribe   attest that this documentation has been prepared under the direction and in the presence of Adebayo Alberts MD.     Provider Attestation - Scribe documentation    All medical record entries made by the Scribe were at my direction and personally dictated by me. I have reviewed the chart and agree that the record accurately reflects my personal performance of the history, physical exam, discussion and plan.

## 2025-07-16 ENCOUNTER — OFFICE VISIT (OUTPATIENT)
Dept: PRIMARY CARE | Facility: CLINIC | Age: 80
End: 2025-07-16
Payer: COMMERCIAL

## 2025-07-16 ENCOUNTER — APPOINTMENT (OUTPATIENT)
Dept: PRIMARY CARE | Facility: CLINIC | Age: 80
End: 2025-07-16
Payer: COMMERCIAL

## 2025-07-16 VITALS
BODY MASS INDEX: 32.25 KG/M2 | HEART RATE: 54 BPM | DIASTOLIC BLOOD PRESSURE: 64 MMHG | SYSTOLIC BLOOD PRESSURE: 138 MMHG | TEMPERATURE: 97.2 F | HEIGHT: 72 IN | OXYGEN SATURATION: 97 % | WEIGHT: 238.1 LBS

## 2025-07-16 DIAGNOSIS — I50.20 SYSTOLIC CONGESTIVE HEART FAILURE, UNSPECIFIED HF CHRONICITY: ICD-10-CM

## 2025-07-16 DIAGNOSIS — I25.10 ARTERIOSCLEROTIC CARDIOVASCULAR DISEASE: Primary | ICD-10-CM

## 2025-07-16 DIAGNOSIS — L20.84 INTRINSIC ECZEMA: ICD-10-CM

## 2025-07-16 DIAGNOSIS — Z12.11 SCREENING FOR COLON CANCER: ICD-10-CM

## 2025-07-16 PROBLEM — H61.22 IMPACTED CERUMEN OF LEFT EAR: Status: RESOLVED | Noted: 2023-12-12 | Resolved: 2025-07-16

## 2025-07-16 PROBLEM — J96.02 ACUTE RESPIRATORY FAILURE WITH HYPOXIA AND HYPERCAPNIA: Status: RESOLVED | Noted: 2025-01-14 | Resolved: 2025-07-16

## 2025-07-16 PROBLEM — J96.01 ACUTE RESPIRATORY FAILURE WITH HYPOXIA AND HYPERCAPNIA: Status: RESOLVED | Noted: 2025-01-14 | Resolved: 2025-07-16

## 2025-07-16 PROCEDURE — 99214 OFFICE O/P EST MOD 30 MIN: CPT | Performed by: INTERNAL MEDICINE

## 2025-07-16 PROCEDURE — 1126F AMNT PAIN NOTED NONE PRSNT: CPT | Performed by: INTERNAL MEDICINE

## 2025-07-16 PROCEDURE — 1160F RVW MEDS BY RX/DR IN RCRD: CPT | Performed by: INTERNAL MEDICINE

## 2025-07-16 PROCEDURE — 3075F SYST BP GE 130 - 139MM HG: CPT | Performed by: INTERNAL MEDICINE

## 2025-07-16 PROCEDURE — 3078F DIAST BP <80 MM HG: CPT | Performed by: INTERNAL MEDICINE

## 2025-07-16 PROCEDURE — 1159F MED LIST DOCD IN RCRD: CPT | Performed by: INTERNAL MEDICINE

## 2025-07-16 RX ORDER — TRIAMCINOLONE ACETONIDE 1 MG/G
1 CREAM TOPICAL 3 TIMES DAILY
Qty: 80 G | Refills: 3 | Status: SHIPPED | OUTPATIENT
Start: 2025-07-16

## 2025-07-16 SDOH — ECONOMIC STABILITY: INCOME INSECURITY: IN THE LAST 12 MONTHS, WAS THERE A TIME WHEN YOU WERE NOT ABLE TO PAY THE MORTGAGE OR RENT ON TIME?: NO

## 2025-07-16 SDOH — ECONOMIC STABILITY: FOOD INSECURITY: WITHIN THE PAST 12 MONTHS, THE FOOD YOU BOUGHT JUST DIDN'T LAST AND YOU DIDN'T HAVE MONEY TO GET MORE.: NEVER TRUE

## 2025-07-16 SDOH — ECONOMIC STABILITY: FOOD INSECURITY: WITHIN THE PAST 12 MONTHS, YOU WORRIED THAT YOUR FOOD WOULD RUN OUT BEFORE YOU GOT MONEY TO BUY MORE.: NEVER TRUE

## 2025-07-16 SDOH — ECONOMIC STABILITY: TRANSPORTATION INSECURITY
IN THE PAST 12 MONTHS, HAS THE LACK OF TRANSPORTATION KEPT YOU FROM MEDICAL APPOINTMENTS OR FROM GETTING MEDICATIONS?: NO

## 2025-07-16 SDOH — ECONOMIC STABILITY: TRANSPORTATION INSECURITY
IN THE PAST 12 MONTHS, HAS LACK OF TRANSPORTATION KEPT YOU FROM MEETINGS, WORK, OR FROM GETTING THINGS NEEDED FOR DAILY LIVING?: NO

## 2025-07-16 ASSESSMENT — PAIN SCALES - GENERAL: PAINLEVEL_OUTOF10: 0-NO PAIN

## 2025-07-16 ASSESSMENT — SOCIAL DETERMINANTS OF HEALTH (SDOH): IN THE PAST 12 MONTHS, HAS THE ELECTRIC, GAS, OIL, OR WATER COMPANY THREATENED TO SHUT OFF SERVICE IN YOUR HOME?: NO

## 2025-07-17 NOTE — PROGRESS NOTES
Subjective     Patient ID: Lui Yu is a 80 y.o. male who presents for 6 month Follow-up (Pt reports forgot B/P meds this morning.).  History of Present Illness  The patient presents for evaluation of heart failure, COPD, sleep apnea, hypertension, and health maintenance.    He reports no current health concerns. He experiences shortness of breath during physical activities such as gardening, which necessitates rest periods. He has a history of heart failure and is under the care of a cardiologist, Dr. Alberts, who has not made any recent changes to his medication regimen. He sees Dr. Alberts twice a year. He reports no presence of blood or black, tarry stools. He also reports no chest pain, pressure, or feelings of faintness. He does experience fatigue but finds relief by sitting down. He reports no kidney pain, burning sensation, or kidney stones. He has noticed swelling in his ankles and takes a diuretic on Mondays, Wednesdays, and Fridays.    His COPD is well-managed. He uses a CPAP machine for his sleep apnea.    He has not had any blood work done in 2025. He is still driving and his memory is sharp. He had a colonoscopy in 2015 by Dr. Abigail Crow.    He forgot to take his blood pressure medication today.    Hobbies: Gardening    PAST SURGICAL HISTORY:  Colonoscopy in 2015 by Dr. Abigail Crow    Objective   Vitals:    07/16/25 1036   BP: 138/64   BP Location: Left arm   Patient Position: Sitting   BP Cuff Size: Adult   Pulse: 54   Temp: 36.2 °C (97.2 °F)   TempSrc: Temporal   SpO2: 97%   Weight: 108 kg (238 lb 1.6 oz)   Height: 1.829 m (6')     Wt Readings from Last 10 Encounters:   07/16/25 108 kg (238 lb 1.6 oz)   04/29/25 110 kg (242 lb)   01/14/25 113 kg (250 lb)   09/26/24 112 kg (248 lb)   07/02/24 112 kg (246 lb 12.8 oz)   03/26/24 117 kg (257 lb)   02/08/24 116 kg (255 lb)   01/03/24 116 kg (255 lb)   07/28/23 113 kg (249 lb)   06/23/23 114 kg (251 lb 4 oz)       Medication:  Current  Outpatient Medications   Medication Instructions    amLODIPine (NORVASC) 5 mg, oral, Daily    clopidogrel (PLAVIX) 75 mg, oral, Daily    ezetimibe (ZETIA) 10 mg, oral, Daily    furosemide (LASIX) 40 mg, oral, 3 times weekly, TAKE 1 TABLET BY MOUTH ON MONDAY, WEDNESDAY, AND FRIDAY    ipratropium-albuteroL (Duo-Neb) 0.5-2.5 mg/3 mL nebulizer solution 3 mL, nebulization, Every 4 hours PRN    metoprolol succinate XL (TOPROL-XL) 25 mg, oral, Daily    nitroglycerin (NITROSTAT) 0.4 mg, sublingual, Every 5 min PRN, PLACE 1 TABLET UNDER THE TONGUE EVERY 5 MINUTES FOR UP TO 3 DOSES AS NEEDED FOR CHEST PAIN.CALL 911 IF PAIN PERSISTS.    rosuvastatin (CRESTOR) 40 mg, oral, Daily    triamcinolone (Kenalog) 0.1 % cream 1 Application, Topical, 3 times daily    valsartan (DIOVAN) 80 mg, oral, Daily     Physical Exam  Gen:  Not in any acute distress   HE ENT: No pallor, No carotid bruit,  No thyromegaly   Lungs:  Clear to auscultation without rales, rhonchi, or rub.  Heart:  RRR, S1, S2, No murmur   Abd: No epigastric tenderness, No CVA tenderness   Extremities: No edema, calf swelling or tenderness.    Skin:  No rash, ecchymosis or erythema.      Review of Systems:  Respiratory:  Denies stridor. No blood in sputum   Cardiovascular:  Denies chest pain, no sudden excessive sweating   Gastrointestinal:  Denies sour burping, no blood in stool    Genitourinary:  Denies blood in urine    Skin:  No new spot changing color or shape or border    Neurological: Denies speech difficulty, no memory disturbance        Recent Labs:   Lab Results   Component Value Date    WBC 8.7 08/28/2023    HGB 13.6 08/28/2023    HCT 41.5 08/28/2023     08/28/2023    CHOL 117 08/28/2023    TRIG 231 (H) 08/28/2023    HDL 39.9 (A) 08/28/2023    ALT 18 08/28/2023    AST 17 08/28/2023     08/28/2023    K 4.3 08/28/2023     08/28/2023    CREATININE 1.10 08/28/2023    BUN 17 08/28/2023    CO2 24 08/28/2023    TSH 1.97 08/28/2023    PSA 1.19  08/28/2023    INR 1.0 08/29/2019     Lab Results   Component Value Date    GLUCOSE 91 08/28/2023    CALCIUM 8.9 08/28/2023     08/28/2023    K 4.3 08/28/2023    CO2 24 08/28/2023     08/28/2023    BUN 17 08/28/2023    CREATININE 1.10 08/28/2023      Lab Results   Component Value Date    CREATININE 1.10 08/28/2023     Lab Results   Component Value Date    PSA 1.19 08/28/2023    PSA 1.30 07/01/2022       Diagnosis and Orders:     Arteriosclerotic cardiovascular disease  Intrinsic eczema  -     triamcinolone (Kenalog) 0.1 % cream; Apply 1 Application topically 3 times a day.  Systolic congestive heart failure, unspecified HF chronicity  Screening for colon cancer  -     Colonoscopy Screening; Average Risk Patient; Future       Assessment & Plan  1. Heart failure.  - Reports being under the care of his cardiologist, Dr. Alberts, with no recent changes to his medication regimen.  - Experiences shortness of breath and muscle fatigue when gardening, managed by resting.  - Lungs are clear with no signs of fluid accumulation.  - Advised to continue current medications and follow up with his cardiologist as scheduled.    2. Chronic obstructive pulmonary disease (COPD).  - Breathing is well-managed with the current treatment plan.  - Lungs are dry and clear upon examination.  - No changes to COPD management were discussed.  - Advised to continue current treatment regimen.    3. Sleep apnea.  - Continues to use CPAP machine, which is essential for his well-being.  - No new symptoms or issues reported.  - CPAP therapy is effective.  - Advised to continue using CPAP machine as prescribed.    4. Hypertension.  - Blood pressure slightly elevated today, likely due to missing medication dose.  - Blood pressure reading is borderline, supposed to be <140.  - Advised to ensure regular intake of blood pressure medication.  - Patient will take medication immediately upon returning home.    5. Health maintenance.  - Colonoscopy  ordered as it has been 10 years since the last one; gastroenterology department will contact to schedule.  - Prescription for cream will be sent to pharmacy.  - Fasting blood work ordered, including tests for prostate, liver, kidney function, and cholesterol levels.  - Instructed to skip breakfast and drink plenty of water before the test.    Follow-up  - Follow up in 6 months.              This medical note was created with the assistance of artificial intelligence (AI) for documentation purposes. The content has been reviewed and confirmed by the healthcare provider for accuracy and completeness. Patient consented to the use of audio recording and use of AI during their visit.

## 2025-07-18 LAB
25(OH)D3+25(OH)D2 SERPL-MCNC: 29 NG/ML (ref 30–100)
ALBUMIN SERPL-MCNC: 4.3 G/DL (ref 3.6–5.1)
ALP SERPL-CCNC: 60 U/L (ref 35–144)
ALT SERPL-CCNC: 18 U/L (ref 9–46)
ANION GAP SERPL CALCULATED.4IONS-SCNC: 7 MMOL/L (CALC) (ref 7–17)
APPEARANCE UR: CLEAR
AST SERPL-CCNC: 19 U/L (ref 10–35)
BACTERIA #/AREA URNS HPF: ABNORMAL /HPF
BASOPHILS # BLD AUTO: 31 CELLS/UL (ref 0–200)
BASOPHILS NFR BLD AUTO: 0.4 %
BILIRUB SERPL-MCNC: 0.9 MG/DL (ref 0.2–1.2)
BILIRUB UR QL STRIP: NEGATIVE
BUN SERPL-MCNC: 20 MG/DL (ref 7–25)
CALCIUM SERPL-MCNC: 9.3 MG/DL (ref 8.6–10.3)
CHLORIDE SERPL-SCNC: 102 MMOL/L (ref 98–110)
CHOLEST SERPL-MCNC: 123 MG/DL
CHOLEST/HDLC SERPL: 3.1 (CALC)
CO2 SERPL-SCNC: 27 MMOL/L (ref 20–32)
COLOR UR: YELLOW
CREAT SERPL-MCNC: 1.16 MG/DL (ref 0.7–1.28)
EGFRCR SERPLBLD CKD-EPI 2021: 64 ML/MIN/1.73M2
EOSINOPHIL # BLD AUTO: 293 CELLS/UL (ref 15–500)
EOSINOPHIL NFR BLD AUTO: 3.8 %
ERYTHROCYTE [DISTWIDTH] IN BLOOD BY AUTOMATED COUNT: 13.8 % (ref 11–15)
GLUCOSE SERPL-MCNC: 99 MG/DL (ref 65–99)
GLUCOSE UR QL STRIP: NEGATIVE
HCT VFR BLD AUTO: 44.1 % (ref 38.5–50)
HDLC SERPL-MCNC: 40 MG/DL
HGB BLD-MCNC: 14.5 G/DL (ref 13.2–17.1)
HGB UR QL STRIP: NEGATIVE
HYALINE CASTS #/AREA URNS LPF: ABNORMAL /LPF
KETONES UR QL STRIP: NEGATIVE
LDLC SERPL CALC-MCNC: 58 MG/DL (CALC)
LEUKOCYTE ESTERASE UR QL STRIP: NEGATIVE
LYMPHOCYTES # BLD AUTO: 2148 CELLS/UL (ref 850–3900)
LYMPHOCYTES NFR BLD AUTO: 27.9 %
MCH RBC QN AUTO: 31.5 PG (ref 27–33)
MCHC RBC AUTO-ENTMCNC: 32.9 G/DL (ref 32–36)
MCV RBC AUTO: 95.7 FL (ref 80–100)
MONOCYTES # BLD AUTO: 955 CELLS/UL (ref 200–950)
MONOCYTES NFR BLD AUTO: 12.4 %
NEUTROPHILS # BLD AUTO: 4274 CELLS/UL (ref 1500–7800)
NEUTROPHILS NFR BLD AUTO: 55.5 %
NITRITE UR QL STRIP: NEGATIVE
NONHDLC SERPL-MCNC: 83 MG/DL (CALC)
PH UR STRIP: 5.5 [PH] (ref 5–8)
PLATELET # BLD AUTO: 245 THOUSAND/UL (ref 140–400)
PMV BLD REES-ECKER: 9 FL (ref 7.5–12.5)
POTASSIUM SERPL-SCNC: 5 MMOL/L (ref 3.5–5.3)
PROT SERPL-MCNC: 7.2 G/DL (ref 6.1–8.1)
PROT UR QL STRIP: ABNORMAL
PSA SERPL-MCNC: 1.6 NG/ML
RBC # BLD AUTO: 4.61 MILLION/UL (ref 4.2–5.8)
RBC #/AREA URNS HPF: ABNORMAL /HPF
SERVICE CMNT-IMP: ABNORMAL
SODIUM SERPL-SCNC: 136 MMOL/L (ref 135–146)
SP GR UR STRIP: 1.01 (ref 1–1.03)
SQUAMOUS #/AREA URNS HPF: ABNORMAL /HPF
T3FREE SERPL-MCNC: 3 PG/ML (ref 2.3–4.2)
TRIGL SERPL-MCNC: 171 MG/DL
TSH SERPL-ACNC: 2.77 MIU/L (ref 0.4–4.5)
WBC # BLD AUTO: 7.7 THOUSAND/UL (ref 3.8–10.8)
WBC #/AREA URNS HPF: ABNORMAL /HPF

## 2025-12-17 ENCOUNTER — APPOINTMENT (OUTPATIENT)
Dept: CARDIOLOGY | Facility: CLINIC | Age: 80
End: 2025-12-17
Payer: COMMERCIAL